# Patient Record
Sex: MALE | Race: OTHER | HISPANIC OR LATINO | URBAN - METROPOLITAN AREA
[De-identification: names, ages, dates, MRNs, and addresses within clinical notes are randomized per-mention and may not be internally consistent; named-entity substitution may affect disease eponyms.]

---

## 2019-03-20 ENCOUNTER — TRANSCRIBE ORDERS (OUTPATIENT)
Dept: ADMINISTRATIVE | Facility: HOSPITAL | Age: 39
End: 2019-03-20

## 2019-03-20 DIAGNOSIS — M77.12 LATERAL EPICONDYLITIS OF LEFT ELBOW: ICD-10-CM

## 2019-03-20 DIAGNOSIS — S50.02XA CONTUSION OF LEFT ELBOW, INITIAL ENCOUNTER: Primary | ICD-10-CM

## 2024-09-10 ENCOUNTER — APPOINTMENT (EMERGENCY)
Dept: CT IMAGING | Facility: HOSPITAL | Age: 44
End: 2024-09-10
Payer: COMMERCIAL

## 2024-09-10 ENCOUNTER — HOSPITAL ENCOUNTER (EMERGENCY)
Facility: HOSPITAL | Age: 44
Discharge: HOME | End: 2024-09-10
Attending: EMERGENCY MEDICINE | Admitting: EMERGENCY MEDICINE
Payer: COMMERCIAL

## 2024-09-10 VITALS
SYSTOLIC BLOOD PRESSURE: 137 MMHG | OXYGEN SATURATION: 99 % | WEIGHT: 259.92 LBS | HEART RATE: 72 BPM | RESPIRATION RATE: 16 BRPM | DIASTOLIC BLOOD PRESSURE: 90 MMHG | TEMPERATURE: 98.6 F

## 2024-09-10 DIAGNOSIS — R10.9 ABDOMINAL PAIN: Primary | ICD-10-CM

## 2024-09-10 DIAGNOSIS — R74.01 TRANSAMINITIS: ICD-10-CM

## 2024-09-10 DIAGNOSIS — N20.0 KIDNEY STONE: ICD-10-CM

## 2024-09-10 LAB
ALBUMIN SERPL BCG-MCNC: 4.1 G/DL (ref 3.5–5)
ALP SERPL-CCNC: 73 U/L (ref 34–104)
ALT SERPL W P-5'-P-CCNC: 78 U/L (ref 7–52)
ANION GAP SERPL CALCULATED.3IONS-SCNC: 4 MMOL/L (ref 4–13)
AST SERPL W P-5'-P-CCNC: 83 U/L (ref 13–39)
BASOPHILS # BLD AUTO: 0.05 THOUSANDS/ÂΜL (ref 0–0.1)
BASOPHILS NFR BLD AUTO: 1 % (ref 0–1)
BILIRUB SERPL-MCNC: 0.35 MG/DL (ref 0.2–1)
BILIRUB UR QL STRIP: NEGATIVE
BUN SERPL-MCNC: 15 MG/DL (ref 5–25)
CALCIUM SERPL-MCNC: 9.2 MG/DL (ref 8.4–10.2)
CHLORIDE SERPL-SCNC: 103 MMOL/L (ref 96–108)
CLARITY UR: CLEAR
CO2 SERPL-SCNC: 29 MMOL/L (ref 21–32)
COLOR UR: YELLOW
CREAT SERPL-MCNC: 1.01 MG/DL (ref 0.6–1.3)
EOSINOPHIL # BLD AUTO: 0.15 THOUSAND/ÂΜL (ref 0–0.61)
EOSINOPHIL NFR BLD AUTO: 2 % (ref 0–6)
ERYTHROCYTE [DISTWIDTH] IN BLOOD BY AUTOMATED COUNT: 13.5 % (ref 11.6–15.1)
GFR SERPL CREATININE-BSD FRML MDRD: 90 ML/MIN/1.73SQ M
GLUCOSE SERPL-MCNC: 89 MG/DL (ref 65–140)
GLUCOSE UR STRIP-MCNC: NEGATIVE MG/DL
HCT VFR BLD AUTO: 47.5 % (ref 36.5–49.3)
HGB BLD-MCNC: 15.5 G/DL (ref 12–17)
HGB UR QL STRIP.AUTO: NEGATIVE
IMM GRANULOCYTES # BLD AUTO: 0.01 THOUSAND/UL (ref 0–0.2)
IMM GRANULOCYTES NFR BLD AUTO: 0 % (ref 0–2)
KETONES UR STRIP-MCNC: NEGATIVE MG/DL
LEUKOCYTE ESTERASE UR QL STRIP: NEGATIVE
LIPASE SERPL-CCNC: 48 U/L (ref 11–82)
LYMPHOCYTES # BLD AUTO: 2.77 THOUSANDS/ÂΜL (ref 0.6–4.47)
LYMPHOCYTES NFR BLD AUTO: 32 % (ref 14–44)
MCH RBC QN AUTO: 28.2 PG (ref 26.8–34.3)
MCHC RBC AUTO-ENTMCNC: 32.6 G/DL (ref 31.4–37.4)
MCV RBC AUTO: 86 FL (ref 82–98)
MONOCYTES # BLD AUTO: 0.76 THOUSAND/ÂΜL (ref 0.17–1.22)
MONOCYTES NFR BLD AUTO: 9 % (ref 4–12)
NEUTROPHILS # BLD AUTO: 5.05 THOUSANDS/ÂΜL (ref 1.85–7.62)
NEUTS SEG NFR BLD AUTO: 56 % (ref 43–75)
NITRITE UR QL STRIP: NEGATIVE
NRBC BLD AUTO-RTO: 0 /100 WBCS
PH UR STRIP.AUTO: 6 [PH] (ref 4.5–8)
PLATELET # BLD AUTO: 199 THOUSANDS/UL (ref 149–390)
PMV BLD AUTO: 11.5 FL (ref 8.9–12.7)
POTASSIUM SERPL-SCNC: 4.6 MMOL/L (ref 3.5–5.3)
PROT SERPL-MCNC: 7.4 G/DL (ref 6.4–8.4)
PROT UR STRIP-MCNC: NEGATIVE MG/DL
RBC # BLD AUTO: 5.5 MILLION/UL (ref 3.88–5.62)
SODIUM SERPL-SCNC: 136 MMOL/L (ref 135–147)
SP GR UR STRIP.AUTO: 1.02 (ref 1–1.03)
UROBILINOGEN UR QL STRIP.AUTO: 0.2 E.U./DL
WBC # BLD AUTO: 8.79 THOUSAND/UL (ref 4.31–10.16)

## 2024-09-10 PROCEDURE — 99285 EMERGENCY DEPT VISIT HI MDM: CPT | Performed by: PHYSICIAN ASSISTANT

## 2024-09-10 PROCEDURE — 85025 COMPLETE CBC W/AUTO DIFF WBC: CPT | Performed by: EMERGENCY MEDICINE

## 2024-09-10 PROCEDURE — 96361 HYDRATE IV INFUSION ADD-ON: CPT

## 2024-09-10 PROCEDURE — 80053 COMPREHEN METABOLIC PANEL: CPT | Performed by: EMERGENCY MEDICINE

## 2024-09-10 PROCEDURE — 36415 COLL VENOUS BLD VENIPUNCTURE: CPT

## 2024-09-10 PROCEDURE — 99284 EMERGENCY DEPT VISIT MOD MDM: CPT

## 2024-09-10 PROCEDURE — 96374 THER/PROPH/DIAG INJ IV PUSH: CPT

## 2024-09-10 PROCEDURE — 81003 URINALYSIS AUTO W/O SCOPE: CPT

## 2024-09-10 PROCEDURE — 74177 CT ABD & PELVIS W/CONTRAST: CPT

## 2024-09-10 PROCEDURE — 83690 ASSAY OF LIPASE: CPT | Performed by: EMERGENCY MEDICINE

## 2024-09-10 RX ORDER — FAMOTIDINE 10 MG/ML
20 INJECTION, SOLUTION INTRAVENOUS ONCE
Status: COMPLETED | OUTPATIENT
Start: 2024-09-10 | End: 2024-09-10

## 2024-09-10 RX ADMIN — IOHEXOL 100 ML: 350 INJECTION, SOLUTION INTRAVENOUS at 16:08

## 2024-09-10 RX ADMIN — FAMOTIDINE 20 MG: 10 INJECTION, SOLUTION INTRAVENOUS at 15:46

## 2024-09-10 RX ADMIN — SODIUM CHLORIDE 1000 ML: 0.9 INJECTION, SOLUTION INTRAVENOUS at 15:47

## 2024-09-10 NOTE — ED PROVIDER NOTES
1. Abdominal pain    2. Transaminitis    3. Kidney stone      ED Disposition       ED Disposition   Discharge    Condition   Stable    Date/Time   Tue Sep 10, 2024  5:20 PM    Comment   Osvaldo Alex discharge to home/self care.                   Assessment & Plan       Medical Decision Making  DDx including but not limited to: appendicitis, gastroenteritis, gastritis, PUD, GERD, gastroparesis, hepatitis, pancreatitis, colitis, enteritis, food poisoning, epiploic appendagitis, mesenteric adenitis, mesenteric panniculitis, IBD, IBS, ileus, bowel obstruction, volvulus, cholecystitis, biliary colic, choledocholithiasis, perforated viscus, tumor, splenic etiology, constipation, diverticulitis, internal hernia, testicular torsion, renal colic, pyelonephritis, UTI.       Plan- Will check basic labs, UA, CT scan abd/pelvis. Will give IVF and pepcid     Labs- mildly elevated LFTs. Remainder of labs unremarkable.     CT abd/pelvis- No acute intra-abdominal pathology.  2 mm nonobstructing right intrarenal calculus.     Discussed all results with patient. Patient currently without pain. Discussed incidental findings of intrarenal calculus and transaminitis.    The management plan was discussed in detail with the patient at bedside and all questions were answered.  Prior to discharge, we provided both verbal and written instructions.  We discussed with the patient the signs and symptoms for which to return to the emergency department.  All questions were answered and patient was comfortable with the plan of care and discharged to home.  Instructed the patient to follow up with the primary care provider and/or specialist provided and their written instructions.  The patient verbalized understanding of our discussion and plan of care, and agrees to return to the Emergency Department for concerns and progression of illness.     At discharge, I instructed the patient to:  -follow up with pcp  -follow up with the recommended  specialists- GI  -return to the ER if symptoms worsened or new symptoms arose  Patient agreed to this plan and was stable at time of discharge.     Amount and/or Complexity of Data Reviewed  Labs: ordered. Decision-making details documented in ED Course.  Radiology: ordered. Decision-making details documented in ED Course.    Risk  Prescription drug management.              Chief Complaint   Patient presents with    Abdominal Pain     Pt c/o generalized abdominal pain starting today. Denies n/v and urinary symptoms.     History reviewed. No pertinent past medical history.   Prior to Admission medications    Not on File        History of Present Illness       HPI    Osvaldo Alex is a 43 y.o. male who identifies as a male presenting to the Emergency Department for abdominal pain. He states pain started this morning at  10san. Pain lasted about 30 mins resolved. Pain was generalized. Had something similar last week and pain resolved. There was no nausea, vomiting, diarrhea. Denies fever, chills, CP, SOB, dysuria, hematuria, frequency. No sick contacts. Denies alcohol or drug use.     Review of Systems   Constitutional:  Negative for chills and fever.   HENT:  Negative for congestion, ear pain and sore throat.    Eyes:  Negative for pain and visual disturbance.   Respiratory:  Negative for cough and shortness of breath.    Cardiovascular:  Negative for chest pain and palpitations.   Gastrointestinal:  Positive for abdominal pain. Negative for diarrhea, nausea and vomiting.   Genitourinary:  Negative for difficulty urinating, dysuria, flank pain, frequency and hematuria.   Musculoskeletal:  Negative for arthralgias and back pain.   Skin:  Negative for color change and rash.   Neurological:  Negative for syncope, weakness and numbness.   All other systems reviewed and are negative.          Objective     ED Triage Vitals [09/10/24 1424]   Temperature Pulse Blood Pressure Respirations SpO2 Patient Position -  Orthostatic VS   98.6 °F (37 °C) 72 131/84 18 96 % Sitting      Temp Source Heart Rate Source BP Location FiO2 (%) Pain Score    Oral Monitor Right arm -- --        Physical Exam  Vitals and nursing note reviewed.   Constitutional:       General: He is not in acute distress.     Appearance: Normal appearance. He is not ill-appearing, toxic-appearing or diaphoretic.   HENT:      Head: Normocephalic and atraumatic.      Right Ear: External ear normal.      Left Ear: External ear normal.      Nose: Nose normal.      Mouth/Throat:      Mouth: Mucous membranes are moist.   Eyes:      Conjunctiva/sclera: Conjunctivae normal.   Cardiovascular:      Rate and Rhythm: Normal rate and regular rhythm.      Heart sounds: Normal heart sounds. No murmur heard.  Pulmonary:      Effort: Pulmonary effort is normal. No respiratory distress.      Breath sounds: Normal breath sounds. No stridor. No wheezing or rhonchi.   Abdominal:      General: Abdomen is flat. Bowel sounds are normal. There is no distension.      Palpations: Abdomen is soft.      Tenderness: There is abdominal tenderness in the epigastric area. There is no guarding.       Musculoskeletal:         General: Normal range of motion.      Cervical back: Normal range of motion.   Neurological:      General: No focal deficit present.      Mental Status: He is alert.   Psychiatric:         Mood and Affect: Mood normal.         Labs Reviewed   COMPREHENSIVE METABOLIC PANEL - Abnormal; Notable for the following components:       Result Value    AST 83 (*)     ALT 78 (*)     All other components within normal limits    Narrative:     National Kidney Disease Foundation guidelines for Chronic Kidney Disease (CKD):     Stage 1 with normal or high GFR (GFR > 90 mL/min/1.73 square meters)    Stage 2 Mild CKD (GFR = 60-89 mL/min/1.73 square meters)    Stage 3A Moderate CKD (GFR = 45-59 mL/min/1.73 square meters)    Stage 3B Moderate CKD (GFR = 30-44 mL/min/1.73 square meters)    Stage  4 Severe CKD (GFR = 15-29 mL/min/1.73 square meters)    Stage 5 End Stage CKD (GFR <15 mL/min/1.73 square meters)  Note: GFR calculation is accurate only with a steady state creatinine   LIPASE - Normal   CBC AND DIFFERENTIAL   URINE MACROSCOPIC, POC    Narrative:     CLINITEK RESULT     CT abdomen pelvis with contrast   Final Interpretation by Charlotte Hummel MD (09/10 1643)      No acute intra-abdominal pathology.   2 mm nonobstructing right intrarenal calculus.         Workstation performed: [id number]             Procedures       Sarah Brooks PA-C  09/10/24 6627

## 2024-09-10 NOTE — Clinical Note
Osvaldo Alex was seen and treated in our emergency department on 9/10/2024.                Diagnosis:     Osvaldo  .    He may return on this date: 09/16/2024         If you have any questions or concerns, please don't hesitate to call.      Sarah Brooks PA-C    ______________________________           _______________          _______________  Hospital Representative                              Date                                Time

## 2024-10-09 ENCOUNTER — HOSPITAL ENCOUNTER (INPATIENT)
Facility: HOSPITAL | Age: 44
LOS: 2 days | Discharge: HOME | DRG: 948 | End: 2024-10-11
Attending: EMERGENCY MEDICINE
Payer: COMMERCIAL

## 2024-10-09 ENCOUNTER — APPOINTMENT (EMERGENCY)
Dept: CT IMAGING | Facility: HOSPITAL | Age: 44
DRG: 948 | End: 2024-10-09
Payer: COMMERCIAL

## 2024-10-09 ENCOUNTER — APPOINTMENT (EMERGENCY)
Dept: ULTRASOUND IMAGING | Facility: HOSPITAL | Age: 44
DRG: 948 | End: 2024-10-09
Payer: COMMERCIAL

## 2024-10-09 DIAGNOSIS — A04.8 H. PYLORI INFECTION: ICD-10-CM

## 2024-10-09 DIAGNOSIS — R74.01 TRANSAMINITIS: Primary | ICD-10-CM

## 2024-10-09 DIAGNOSIS — R10.9 ABDOMINAL PAIN: ICD-10-CM

## 2024-10-09 LAB
ALBUMIN SERPL BCG-MCNC: 4.3 G/DL (ref 3.5–5)
ALP SERPL-CCNC: 127 U/L (ref 34–104)
ALT SERPL W P-5'-P-CCNC: 1041 U/L (ref 7–52)
ANION GAP SERPL CALCULATED.3IONS-SCNC: 2 MMOL/L (ref 4–13)
ANISOCYTOSIS BLD QL SMEAR: PRESENT
APAP SERPL-MCNC: 5 UG/ML (ref 10–20)
AST SERPL W P-5'-P-CCNC: 878 U/L (ref 13–39)
BASOPHILS # BLD MANUAL: 0.06 THOUSAND/UL (ref 0–0.1)
BASOPHILS NFR MAR MANUAL: 1 % (ref 0–1)
BILIRUB SERPL-MCNC: 0.58 MG/DL (ref 0.2–1)
BILIRUB UR QL STRIP: NEGATIVE
BUN SERPL-MCNC: 12 MG/DL (ref 5–25)
CALCIUM SERPL-MCNC: 9.8 MG/DL (ref 8.4–10.2)
CHLORIDE SERPL-SCNC: 100 MMOL/L (ref 96–108)
CLARITY UR: CLEAR
CO2 SERPL-SCNC: 35 MMOL/L (ref 21–32)
COLOR UR: YELLOW
CREAT SERPL-MCNC: 1 MG/DL (ref 0.6–1.3)
EOSINOPHIL # BLD MANUAL: 0.26 THOUSAND/UL (ref 0–0.4)
EOSINOPHIL NFR BLD MANUAL: 4 % (ref 0–6)
ERYTHROCYTE [DISTWIDTH] IN BLOOD BY AUTOMATED COUNT: 13.8 % (ref 11.6–15.1)
ETHANOL SERPL-MCNC: <10 MG/DL
GFR SERPL CREATININE-BSD FRML MDRD: 91 ML/MIN/1.73SQ M
GIANT PLATELETS BLD QL SMEAR: PRESENT
GLUCOSE SERPL-MCNC: 108 MG/DL (ref 65–140)
GLUCOSE UR STRIP-MCNC: NEGATIVE MG/DL
HCT VFR BLD AUTO: 47.8 % (ref 36.5–49.3)
HGB BLD-MCNC: 16 G/DL (ref 12–17)
HGB UR QL STRIP.AUTO: NEGATIVE
KETONES UR STRIP-MCNC: NEGATIVE MG/DL
LEUKOCYTE ESTERASE UR QL STRIP: NEGATIVE
LIPASE SERPL-CCNC: 68 U/L (ref 11–82)
LYMPHOCYTES # BLD AUTO: 1.99 THOUSAND/UL (ref 0.6–4.47)
LYMPHOCYTES # BLD AUTO: 29 % (ref 14–44)
MCH RBC QN AUTO: 28.9 PG (ref 26.8–34.3)
MCHC RBC AUTO-ENTMCNC: 33.5 G/DL (ref 31.4–37.4)
MCV RBC AUTO: 86 FL (ref 82–98)
MONOCYTES # BLD AUTO: 0.26 THOUSAND/UL (ref 0–1.22)
MONOCYTES NFR BLD: 4 % (ref 4–12)
NEUTROPHILS # BLD MANUAL: 3.86 THOUSAND/UL (ref 1.85–7.62)
NEUTS BAND NFR BLD MANUAL: 1 % (ref 0–8)
NEUTS SEG NFR BLD AUTO: 59 % (ref 43–75)
NITRITE UR QL STRIP: NEGATIVE
PH UR STRIP.AUTO: 6.5 [PH] (ref 4.5–8)
PLATELET # BLD AUTO: 171 THOUSANDS/UL (ref 149–390)
PLATELET BLD QL SMEAR: ADEQUATE
PMV BLD AUTO: 11.6 FL (ref 8.9–12.7)
POTASSIUM SERPL-SCNC: 4.7 MMOL/L (ref 3.5–5.3)
PROT SERPL-MCNC: 7.8 G/DL (ref 6.4–8.4)
PROT UR STRIP-MCNC: NEGATIVE MG/DL
RBC # BLD AUTO: 5.53 MILLION/UL (ref 3.88–5.62)
SALICYLATES SERPL-MCNC: <5 MG/DL (ref 3–20)
SODIUM SERPL-SCNC: 137 MMOL/L (ref 135–147)
SP GR UR STRIP.AUTO: 1.02 (ref 1–1.03)
UROBILINOGEN UR QL STRIP.AUTO: 0.2 E.U./DL
VARIANT LYMPHS # BLD AUTO: 2 %
WBC # BLD AUTO: 6.43 THOUSAND/UL (ref 4.31–10.16)

## 2024-10-09 PROCEDURE — 80053 COMPREHEN METABOLIC PANEL: CPT

## 2024-10-09 PROCEDURE — 36415 COLL VENOUS BLD VENIPUNCTURE: CPT

## 2024-10-09 PROCEDURE — 99284 EMERGENCY DEPT VISIT MOD MDM: CPT

## 2024-10-09 PROCEDURE — 96361 HYDRATE IV INFUSION ADD-ON: CPT

## 2024-10-09 PROCEDURE — 85027 COMPLETE CBC AUTOMATED: CPT

## 2024-10-09 PROCEDURE — 96375 TX/PRO/DX INJ NEW DRUG ADDON: CPT

## 2024-10-09 PROCEDURE — 82077 ASSAY SPEC XCP UR&BREATH IA: CPT

## 2024-10-09 PROCEDURE — 80179 DRUG ASSAY SALICYLATE: CPT

## 2024-10-09 PROCEDURE — 85007 BL SMEAR W/DIFF WBC COUNT: CPT

## 2024-10-09 PROCEDURE — 80143 DRUG ASSAY ACETAMINOPHEN: CPT

## 2024-10-09 PROCEDURE — 76705 ECHO EXAM OF ABDOMEN: CPT

## 2024-10-09 PROCEDURE — 80074 ACUTE HEPATITIS PANEL: CPT | Performed by: EMERGENCY MEDICINE

## 2024-10-09 PROCEDURE — 96374 THER/PROPH/DIAG INJ IV PUSH: CPT

## 2024-10-09 PROCEDURE — 83690 ASSAY OF LIPASE: CPT

## 2024-10-09 PROCEDURE — 81003 URINALYSIS AUTO W/O SCOPE: CPT

## 2024-10-09 PROCEDURE — 74177 CT ABD & PELVIS W/CONTRAST: CPT

## 2024-10-09 RX ORDER — FAMOTIDINE 20 MG/1
20 TABLET, FILM COATED ORAL ONCE
Status: COMPLETED | OUTPATIENT
Start: 2024-10-09 | End: 2024-10-09

## 2024-10-09 RX ORDER — KETOROLAC TROMETHAMINE 30 MG/ML
15 INJECTION, SOLUTION INTRAMUSCULAR; INTRAVENOUS ONCE
Status: COMPLETED | OUTPATIENT
Start: 2024-10-09 | End: 2024-10-09

## 2024-10-09 RX ORDER — ONDANSETRON 2 MG/ML
4 INJECTION INTRAMUSCULAR; INTRAVENOUS ONCE
Status: COMPLETED | OUTPATIENT
Start: 2024-10-09 | End: 2024-10-09

## 2024-10-09 RX ORDER — ACETAMINOPHEN 325 MG/1
650 TABLET ORAL ONCE
Status: COMPLETED | OUTPATIENT
Start: 2024-10-09 | End: 2024-10-09

## 2024-10-09 RX ADMIN — ACETAMINOPHEN 650 MG: 325 TABLET, FILM COATED ORAL at 21:26

## 2024-10-09 RX ADMIN — ONDANSETRON 4 MG: 2 INJECTION INTRAMUSCULAR; INTRAVENOUS at 21:33

## 2024-10-09 RX ADMIN — FAMOTIDINE 20 MG: 20 TABLET, FILM COATED ORAL at 21:25

## 2024-10-09 RX ADMIN — IOHEXOL 100 ML: 350 INJECTION, SOLUTION INTRAVENOUS at 22:18

## 2024-10-09 RX ADMIN — SODIUM CHLORIDE 1000 ML: 0.9 INJECTION, SOLUTION INTRAVENOUS at 21:33

## 2024-10-09 RX ADMIN — KETOROLAC TROMETHAMINE 15 MG: 30 INJECTION, SOLUTION INTRAMUSCULAR; INTRAVENOUS at 21:33

## 2024-10-09 NOTE — Clinical Note
Case was discussed with SILVIA and the patient's admission status was agreed to be Admission Status: observation status to the service of Dr. Alcazar

## 2024-10-09 NOTE — LETTER
[place] [address]  [address] [facility] [address]  Dept: [phone]    October 11, 2024     Patient: Osvaldo Alex   YOB: 1980   Date of Visit: 10/9/2024       To Whom it May Concern:    Osvaldo Alex is under my professional care. He was seen in the hospital from 10/9/2024 to 10/11/24. He may return to work on 10/14/2024 without limitations.    If you have any questions or concerns, please don't hesitate to call.         Sincerely,          Papo Aden MD

## 2024-10-10 ENCOUNTER — APPOINTMENT (INPATIENT)
Dept: ULTRASOUND IMAGING | Facility: HOSPITAL | Age: 44
DRG: 948 | End: 2024-10-10
Payer: COMMERCIAL

## 2024-10-10 PROBLEM — L40.0 PLAQUE PSORIASIS: Status: ACTIVE | Noted: 2024-10-10

## 2024-10-10 PROBLEM — E78.5 HYPERLIPIDEMIA: Status: ACTIVE | Noted: 2024-10-10

## 2024-10-10 LAB
ALBUMIN SERPL BCG-MCNC: 4 G/DL (ref 3.5–5)
ALP SERPL-CCNC: 120 U/L (ref 34–104)
ALT SERPL W P-5'-P-CCNC: 888 U/L (ref 7–52)
ANA SER QL IA: NEGATIVE
ANION GAP SERPL CALCULATED.3IONS-SCNC: 6 MMOL/L (ref 4–13)
APAP SERPL-MCNC: <2 UG/ML (ref 10–20)
AST SERPL W P-5'-P-CCNC: 531 U/L (ref 13–39)
BASOPHILS # BLD AUTO: 0.04 THOUSANDS/ΜL (ref 0–0.1)
BASOPHILS NFR BLD AUTO: 1 % (ref 0–1)
BILIRUB SERPL-MCNC: 0.62 MG/DL (ref 0.2–1)
BUN SERPL-MCNC: 11 MG/DL (ref 5–25)
CALCIUM SERPL-MCNC: 8.9 MG/DL (ref 8.4–10.2)
CHLORIDE SERPL-SCNC: 103 MMOL/L (ref 96–108)
CK SERPL-CCNC: 187 U/L (ref 39–308)
CO2 SERPL-SCNC: 28 MMOL/L (ref 21–32)
CREAT SERPL-MCNC: 0.88 MG/DL (ref 0.6–1.3)
EOSINOPHIL # BLD AUTO: 0.14 THOUSAND/ΜL (ref 0–0.61)
EOSINOPHIL NFR BLD AUTO: 2 % (ref 0–6)
ERYTHROCYTE [DISTWIDTH] IN BLOOD BY AUTOMATED COUNT: 13.7 % (ref 11.6–15.1)
GFR SERPL CREATININE-BSD FRML MDRD: 105 ML/MIN/1.73SQ M
GLUCOSE SERPL-MCNC: 85 MG/DL (ref 65–140)
HAV IGM SER QL: NORMAL
HBV CORE IGM SER QL: NORMAL
HBV SURFACE AG SER QL: NORMAL
HCT VFR BLD AUTO: 47.3 % (ref 36.5–49.3)
HCV AB SER QL: NORMAL
HGB BLD-MCNC: 15.7 G/DL (ref 12–17)
IMM GRANULOCYTES # BLD AUTO: 0.01 THOUSAND/UL (ref 0–0.2)
IMM GRANULOCYTES NFR BLD AUTO: 0 % (ref 0–2)
INR PPP: 0.95 (ref 0.85–1.19)
LYMPHOCYTES # BLD AUTO: 1.83 THOUSANDS/ΜL (ref 0.6–4.47)
LYMPHOCYTES NFR BLD AUTO: 32 % (ref 14–44)
MAGNESIUM SERPL-MCNC: 2.1 MG/DL (ref 1.9–2.7)
MCH RBC QN AUTO: 28.8 PG (ref 26.8–34.3)
MCHC RBC AUTO-ENTMCNC: 33.2 G/DL (ref 31.4–37.4)
MCV RBC AUTO: 87 FL (ref 82–98)
MONOCYTES # BLD AUTO: 0.45 THOUSAND/ΜL (ref 0.17–1.22)
MONOCYTES NFR BLD AUTO: 8 % (ref 4–12)
NEUTROPHILS # BLD AUTO: 3.29 THOUSANDS/ΜL (ref 1.85–7.62)
NEUTS SEG NFR BLD AUTO: 57 % (ref 43–75)
NRBC BLD AUTO-RTO: 0 /100 WBCS
PLATELET # BLD AUTO: 166 THOUSANDS/UL (ref 149–390)
PMV BLD AUTO: 12.3 FL (ref 8.9–12.7)
POTASSIUM SERPL-SCNC: 4 MMOL/L (ref 3.5–5.3)
PROT SERPL-MCNC: 7.3 G/DL (ref 6.4–8.4)
PROTHROMBIN TIME: 12.9 SECONDS (ref 12.3–15)
RBC # BLD AUTO: 5.46 MILLION/UL (ref 3.88–5.62)
SODIUM SERPL-SCNC: 137 MMOL/L (ref 135–147)
WBC # BLD AUTO: 5.76 THOUSAND/UL (ref 4.31–10.16)

## 2024-10-10 PROCEDURE — 86664 EPSTEIN-BARR NUCLEAR ANTIGEN: CPT | Performed by: STUDENT IN AN ORGANIZED HEALTH CARE EDUCATION/TRAINING PROGRAM

## 2024-10-10 PROCEDURE — 87529 HSV DNA AMP PROBE: CPT | Performed by: STUDENT IN AN ORGANIZED HEALTH CARE EDUCATION/TRAINING PROGRAM

## 2024-10-10 PROCEDURE — 93976 VASCULAR STUDY: CPT

## 2024-10-10 PROCEDURE — 80143 DRUG ASSAY ACETAMINOPHEN: CPT

## 2024-10-10 PROCEDURE — 85025 COMPLETE CBC W/AUTO DIFF WBC: CPT

## 2024-10-10 PROCEDURE — 82787 IGG 1 2 3 OR 4 EACH: CPT | Performed by: STUDENT IN AN ORGANIZED HEALTH CARE EDUCATION/TRAINING PROGRAM

## 2024-10-10 PROCEDURE — 99222 1ST HOSP IP/OBS MODERATE 55: CPT

## 2024-10-10 PROCEDURE — 86663 EPSTEIN-BARR ANTIBODY: CPT | Performed by: STUDENT IN AN ORGANIZED HEALTH CARE EDUCATION/TRAINING PROGRAM

## 2024-10-10 PROCEDURE — 86644 CMV ANTIBODY: CPT | Performed by: STUDENT IN AN ORGANIZED HEALTH CARE EDUCATION/TRAINING PROGRAM

## 2024-10-10 PROCEDURE — 86038 ANTINUCLEAR ANTIBODIES: CPT | Performed by: STUDENT IN AN ORGANIZED HEALTH CARE EDUCATION/TRAINING PROGRAM

## 2024-10-10 PROCEDURE — 99254 IP/OBS CNSLTJ NEW/EST MOD 60: CPT | Performed by: INTERNAL MEDICINE

## 2024-10-10 PROCEDURE — 86665 EPSTEIN-BARR CAPSID VCA: CPT | Performed by: STUDENT IN AN ORGANIZED HEALTH CARE EDUCATION/TRAINING PROGRAM

## 2024-10-10 PROCEDURE — 82784 ASSAY IGA/IGD/IGG/IGM EACH: CPT | Performed by: STUDENT IN AN ORGANIZED HEALTH CARE EDUCATION/TRAINING PROGRAM

## 2024-10-10 PROCEDURE — 86645 CMV ANTIBODY IGM: CPT | Performed by: STUDENT IN AN ORGANIZED HEALTH CARE EDUCATION/TRAINING PROGRAM

## 2024-10-10 PROCEDURE — 86015 ACTIN ANTIBODY EACH: CPT | Performed by: STUDENT IN AN ORGANIZED HEALTH CARE EDUCATION/TRAINING PROGRAM

## 2024-10-10 PROCEDURE — 85610 PROTHROMBIN TIME: CPT

## 2024-10-10 PROCEDURE — 83735 ASSAY OF MAGNESIUM: CPT

## 2024-10-10 PROCEDURE — 82550 ASSAY OF CK (CPK): CPT | Performed by: STUDENT IN AN ORGANIZED HEALTH CARE EDUCATION/TRAINING PROGRAM

## 2024-10-10 PROCEDURE — 80053 COMPREHEN METABOLIC PANEL: CPT

## 2024-10-10 RX ORDER — PRAVASTATIN SODIUM 40 MG
40 TABLET ORAL
Status: DISCONTINUED | OUTPATIENT
Start: 2024-10-10 | End: 2024-10-10

## 2024-10-10 RX ORDER — SODIUM CHLORIDE 9 MG/ML
50 INJECTION, SOLUTION INTRAVENOUS CONTINUOUS
Status: DISCONTINUED | OUTPATIENT
Start: 2024-10-10 | End: 2024-10-10

## 2024-10-10 RX ADMIN — SODIUM CHLORIDE 50 ML/HR: 0.9 INJECTION, SOLUTION INTRAVENOUS at 02:57

## 2024-10-10 NOTE — ASSESSMENT & PLAN NOTE
[facility]  Receives ixekizumab (Taltz) injections monthly

## 2024-10-10 NOTE — ED ATTENDING ATTESTATION
10/9/2024  I, Elbert Faith MD, saw and evaluated the patient. I have discussed the patient with the resident/non-physician practitioner and agree with the resident's/non-physician practitioner's findings, Plan of Care, and MDM as documented in the resident's/non-physician practitioner's note, except where noted. All available labs and Radiology studies were reviewed.  I was present for key portions of any procedure(s) performed by the resident/non-physician practitioner and I was immediately available to provide assistance.       At this point I agree with the current assessment done in the Emergency Department.  I have conducted an independent evaluation of this patient a history and physical is as follows:    ED Course     43-year-old male here for evaluation of upper abdominal pain.  He was seen in our emergency department about a month ago for similar symptoms.  At that time had labs and CT scan abdomen pelvis that was unremarkable/reassuring.  States that symptoms returned today.  He is describing generalized abdominal pain in his epigastrium and right upper quadrant with radiation to his back.  Not really having nausea or vomiting.  No fevers.  History of bariatric surgery about 2 years ago.    Exam: GCS 15 nonfocal, sclera nonicteric conjunctive normal, regular rate and rhythm, clear to auscultation bilaterally, abdomen is soft, obese, nondistended, mild epigastric tenderness without rebound or guarding.  Extremities nontender without edema.    Impression and plan: Epigastric and right upper quadrant pain with radiation to the back.  Will plan to check laboratory studies to rule out biliary obstructive disease, pancreatitis, significant leukocytosis or electrolyte abnormalities.  Treat symptoms, CT scan abdomen pelvis with contrast and reassess.    Critical Care Time  Procedures

## 2024-10-10 NOTE — ED PROVIDER NOTES
Final diagnoses:   Abdominal pain   Transaminitis     ED Disposition       ED Disposition   Admit    Condition   Stable    Date/Time   Wed Oct 9, 2024 11:24 PM    Comment   Case was discussed with SILVIA and the patient's admission status was agreed to be Admission Status: observation status to the service of Dr. Muniz.               Assessment & Plan       Medical Decision Making  43-year-old male presents emergency department complaining of abdominal pain    DDx: Pancreatitis, gastritis, perforated viscus, gastric ulcer, cholecystitis, cholelithiasis    Plan: CT abdomen and pelvis, CMP, CBC, lipase, UA, multimodal regimen for pain control, IV fluids    See ED course for further discussion      Amount and/or Complexity of Data Reviewed  Labs: ordered. Decision-making details documented in ED Course.  Radiology: ordered. Decision-making details documented in ED Course.    Risk  OTC drugs.  Prescription drug management.  Decision regarding hospitalization.        ED Course as of 10/10/24 0029   Wed Oct 09, 2024   2106 Blood Pressure: 149/85   2106 Temperature: 98.2 °F (36.8 °C)   2106 Temp Source: Oral   2106 Pulse: 63   2106 Respirations: 18   2106 SpO2: 97 %   2144 Color, UA: Yellow   2144 Clarity, UA: Clear   2144 pH, UA: 6.5   2144 Leukocytes, UA: Negative   2144 Nitrite, UA: Negative   2144 Ketones, UA: Negative   2144 Glucose, UA: Negative   2144 WBC: 6.43   2144 RBC: 5.53   2144 Hemoglobin: 16.0   2144 Hematocrit: 47.8   2144 Platelet Count: 171   2213 AST(!): 878   2213 ALT(!): 1,041   2213 ALK PHOS(!): 127   2230 Sodium: 137   2231 Potassium: 4.7   2231 GFR, Calculated: 91   2244 CT Abdomen pelvis with contrast  IMPRESSION:     No acute abdominopelvic abnormality identified.  No free air or free fluid.     Stable punctate right nephrolithiasis.        Workstation performed: [id number]     2310 ACETAMINOPHEN LEVEL(!): 5   2310 SALICYLATE LEVEL: <5   2310 ETHANOL: <10   2324 Patient reevaluated.  Explained  all test results.  Denies Tylenol ingestion.  Denies any recent travel.  No one else around him is sick.  Will order right upper quadrant and admit to [facility] for further observation       Medications   ketorolac (TORADOL) injection 15 mg (15 mg Intravenous Given 10/9/24 2133)   ondansetron (ZOFRAN) injection 4 mg (4 mg Intravenous Given 10/9/24 2133)   sodium chloride 0.9 % bolus 1,000 mL (0 mL Intravenous Stopped 10/9/24 2322)   acetaminophen (TYLENOL) tablet 650 mg (650 mg Oral Given 10/9/24 2126)   famotidine (PEPCID) tablet 20 mg (20 mg Oral Given 10/9/24 2125)   iohexol (OMNIPAQUE) 350 MG/ML injection (MULTI-DOSE) 100 mL (100 mL Intravenous Given 10/9/24 2218)       ED Risk Strat Scores                           SBIRT 22yo+      Flowsheet Row Most Recent Value   Initial Alcohol Screen:  AUDIT-C     1. How often do you have a drink containing alcohol? 0 Filed at: 10/09/2024 2059   2. How many drinks containing alcohol do you have on a typical day you are drinking?  0 Filed at: 10/09/2024 2059   3a. Male UNDER 65: How often do you have five or more drinks on one occasion? 0 Filed at: 10/09/2024 2059   Audit-C Score 0 Filed at: 10/09/2024 2059   AMAURI: How many times in the past year have you...    Used an illegal drug or used a prescription medication for non-medical reasons? Never Filed at: 10/09/2024 2059                            History of Present Illness       Chief Complaint   Patient presents with    Abdominal Pain     Pt states having abd pain since Monday. Currently 10/10 pain. Took omeprazole w/ no relief. Denies n/v/d. Denies  symptoms       No past medical history on file.   No past surgical history on file.   No family history on file.   Social History     Tobacco Use    Smoking status: Never    Smokeless tobacco: Never   Substance Use Topics    Alcohol use: Yes     Comment: occ    Drug use: Never      E-Cigarette/Vaping      E-Cigarette/Vaping Substances      I have reviewed and agree with the  history as documented.     43-year-old male with a past medical history of abdominal pain, transaminitis, nephrolithiasis presents emergency department with upper abdominal pain.  Complains of pain in the right upper, epigastric and left upper quadrant regions.  Pain started suddenly about 4 hours ago.  No dysuria or hematuria.  No vomiting however intermittent nausea.  States his pain feels similar to his previous episode.        Review of Systems   Gastrointestinal:  Positive for abdominal pain and nausea. Negative for vomiting.   All other systems reviewed and are negative.          Objective       ED Triage Vitals   Temperature Pulse Blood Pressure Respirations SpO2 Patient Position - Orthostatic VS   10/09/24 2059 10/09/24 2059 10/09/24 2059 10/09/24 2059 10/09/24 2059 10/09/24 2059   98.2 °F (36.8 °C) 63 149/85 18 97 % Sitting      Temp Source Heart Rate Source BP Location FiO2 (%) Pain Score    10/09/24 2059 10/09/24 2059 10/09/24 2059 -- 10/09/24 2126    Oral Monitor Right arm  10 - Worst Possible Pain      Vitals      Date and Time Temp Pulse SpO2 Resp BP Pain Score FACES Pain Rating User   10/09/24 2321 -- 60 96 % 18 121/86 -- -- AA   10/09/24 2158 -- -- -- -- -- 6 -- AA   10/09/24 2126 -- -- -- -- -- 10 - Worst Possible Pain -- AA   10/09/24 2059 98.2 °F (36.8 °C) 63 97 % 18 149/85 -- -- DR            Physical Exam  Vitals and nursing note reviewed.   Constitutional:       General: He is not in acute distress.     Appearance: He is well-developed.   HENT:      Head: Normocephalic and atraumatic.   Eyes:      Conjunctiva/sclera: Conjunctivae normal.   Cardiovascular:      Rate and Rhythm: Normal rate and regular rhythm.      Heart sounds: No murmur heard.  Pulmonary:      Effort: Pulmonary effort is normal. No respiratory distress.      Breath sounds: Normal breath sounds.   Abdominal:      Palpations: Abdomen is soft.      Tenderness: There is abdominal tenderness in the right upper quadrant, epigastric  area and left upper quadrant.   Musculoskeletal:         General: No swelling.      Cervical back: Neck supple.   Skin:     General: Skin is warm and dry.      Capillary Refill: Capillary refill takes less than 2 seconds.   Neurological:      General: No focal deficit present.      Mental Status: He is alert and oriented to person, place, and time. Mental status is at baseline.      GCS: GCS eye subscore is 4. GCS verbal subscore is 5. GCS motor subscore is 6.      Cranial Nerves: No cranial nerve deficit.      Sensory: No sensory deficit.      Motor: No weakness.      Coordination: Coordination normal.      Gait: Gait normal.   Psychiatric:         Mood and Affect: Mood normal.         Results Reviewed       Procedure Component Value Units Date/Time    Salicylate level [[id number]]  (Normal) Collected: 10/09/24 2231    Lab Status: Final result Specimen: Blood from Arm, Right Updated: 10/09/24 2308     Salicylate Lvl <5 mg/dL     Acetaminophen level-If concentration is detectable, please discuss with medical  on call. [[id number]]  (Abnormal) Collected: 10/09/24 2231    Lab Status: Final result Specimen: Blood from Arm, Right Updated: 10/09/24 2308     Acetaminophen Level 5 ug/mL     Ethanol [[id number]]  (Normal) Collected: 10/09/24 2231    Lab Status: Final result Specimen: Blood from Arm, Right Updated: 10/09/24 2251     Ethanol Lvl <10 mg/dL     Hepatitis panel, acute [[id number]] Collected: 10/09/24 2231    Lab Status: In process Specimen: Blood from Arm, Right Updated: 10/09/24 2236    CMP [[id number]]  (Abnormal) Collected: 10/09/24 2136    Lab Status: Final result Specimen: Blood from Arm, Right Updated: 10/09/24 2208     Sodium 137 mmol/L      Potassium 4.7 mmol/L      Chloride 100 mmol/L      CO2 35 mmol/L      ANION GAP 2 mmol/L      BUN 12 mg/dL      Creatinine 1.00 mg/dL      Glucose 108 mg/dL      Calcium 9.8 mg/dL       U/L      ALT 1,041 U/L      Alkaline Phosphatase 127 U/L      Total  Protein 7.8 g/dL      Albumin 4.3 g/dL      Total Bilirubin 0.58 mg/dL      eGFR 91 ml/min/1.73sq m     Narrative:      National Kidney Disease Foundation guidelines for Chronic Kidney Disease (CKD):     Stage 1 with normal or high GFR (GFR > 90 mL/min/1.73 square meters)    Stage 2 Mild CKD (GFR = 60-89 mL/min/1.73 square meters)    Stage 3A Moderate CKD (GFR = 45-59 mL/min/1.73 square meters)    Stage 3B Moderate CKD (GFR = 30-44 mL/min/1.73 square meters)    Stage 4 Severe CKD (GFR = 15-29 mL/min/1.73 square meters)    Stage 5 End Stage CKD (GFR <15 mL/min/1.73 square meters)  Note: GFR calculation is accurate only with a steady state creatinine    Lipase [[id number]]  (Normal) Collected: 10/09/24 2136    Lab Status: Final result Specimen: Blood from Arm, Right Updated: 10/09/24 2208     Lipase 68 u/L     Manual Differential(PHLEBS Do Not Order) [[id number]]  (Abnormal) Collected: 10/09/24 2136    Lab Status: Final result Specimen: Blood from Arm, Right Updated: 10/09/24 2206     Segmented % 59 %      Bands % 1 %      Lymphocytes % 29 %      Monocytes % 4 %      Eosinophils % 4 %      Basophils % 1 %      Atypical Lymphocytes % 2 %      Absolute Neutrophils 3.86 Thousand/uL      Absolute Lymphocytes 1.99 Thousand/uL      Absolute Monocytes 0.26 Thousand/uL      Absolute Eosinophils 0.26 Thousand/uL      Absolute Basophils 0.06 Thousand/uL      Total Counted --     Platelet Estimate Adequate     Giant PLTs Present     Anisocytosis Present    CBC and differential [[id number]]  (Normal) Collected: 10/09/24 2136    Lab Status: Final result Specimen: Blood from Arm, Right Updated: 10/09/24 2144     WBC 6.43 Thousand/uL      RBC 5.53 Million/uL      Hemoglobin 16.0 g/dL      Hematocrit 47.8 %      MCV 86 fL      MCH 28.9 pg      MCHC 33.5 g/dL      RDW 13.8 %      MPV 11.6 fL      Platelets 171 Thousands/uL     Narrative:      This is an appended report.  These results have been appended to a previously verified report.     Urine Macroscopic, POC [[id number]] Collected: 10/09/24 2141    Lab Status: Final result Specimen: Urine Updated: 10/09/24 2142     Color, UA Yellow     Clarity, UA Clear     pH, UA 6.5     Leukocytes, UA Negative     Nitrite, UA Negative     Protein, UA Negative mg/dl      Glucose, UA Negative mg/dl      Ketones, UA Negative mg/dl      Urobilinogen, UA 0.2 E.U./dl      Bilirubin, UA Negative     Occult Blood, UA Negative     Specific Gravity, UA 1.020    Narrative:      CLINITEK RESULT            US right upper quadrant   Final Interpretation by Pamela Osei MD (10/09 2342)   Cholelithiasis. No sonographic signs of cholecystitis.   No biliary dilatation. Unremarkable liver.   Incidental findings, as above.      Workstation performed: [id number]         CT Abdomen pelvis with contrast   Final Interpretation by Jacques Madden MD (10/09 2238)      No acute abdominopelvic abnormality identified.   No free air or free fluid.      Stable punctate right nephrolithiasis.         Workstation performed: [id number]             Procedures    ED Medication and Procedure Management   None     There are no discharge medications for this patient.    No discharge procedures on file.  ED SEPSIS DOCUMENTATION   Time reflects when diagnosis was documented in both MDM as applicable and the Disposition within this note       Time User Action Codes Description Comment    10/9/2024 10:33 PM Juan Zapata [R10.9] Abdominal pain     10/9/2024 10:34 PM Juan Zapata [R74.01] Transaminitis     10/9/2024 11:24 PM Juan Zapata [R10.9] Abdominal pain     10/9/2024 11:24 PM Juan Zapata [R74.01] Transaminitis                  Juan Zapata DO  10/10/24 0029

## 2024-10-10 NOTE — PLAN OF CARE
Problem: PAIN - ADULT  Goal: Verbalizes/displays adequate comfort level or baseline comfort level  Description: Interventions:  - Encourage patient to monitor pain and request assistance  - Assess pain using appropriate pain scale  - Administer analgesics based on type and severity of pain and evaluate response  - Implement non-pharmacological measures as appropriate and evaluate response  - Consider cultural and social influences on pain and pain management  - Notify physician/advanced practitioner if interventions unsuccessful or patient reports new pain  10/10/2024 1116 by Nisreen Aguirre RN  Outcome: Progressing  10/10/2024 1116 by Nisreen Aguirre RN  Outcome: Progressing  10/10/2024 1115 by Nisreen Aguirre RN  Outcome: Progressing     Problem: INFECTION - ADULT  Goal: Absence or prevention of progression during hospitalization  Description: INTERVENTIONS:  - Assess and monitor for signs and symptoms of infection  - Monitor lab/diagnostic results  - Monitor all insertion sites, i.e. indwelling lines, tubes, and drains  - Monitor endotracheal if appropriate and nasal secretions for changes in amount and color  - [place] appropriate cooling/warming therapies per order  - Administer medications as ordered  - Instruct and encourage patient and family to use good hand hygiene technique  - Identify and instruct in appropriate isolation precautions for identified infection/condition  10/10/2024 1116 by Nisreen Aguirre RN  Outcome: Progressing  10/10/2024 1116 by Nisreen Aguirre RN  Outcome: Progressing  10/10/2024 1115 by Nisreen Aguirre RN  Outcome: Progressing  Goal: Absence of fever/infection during neutropenic period  Description: INTERVENTIONS:  - Monitor WBC    10/10/2024 1116 by Nisreen Aguirre RN  Outcome: Progressing  10/10/2024 1116 by Nisreen Aguirre RN  Outcome: Progressing  10/10/2024 1115 by Nisreen Aguirre RN  Outcome: Progressing     Problem: SAFETY ADULT  Goal: Patient will remain free of falls  Description:  INTERVENTIONS:  - Educate patient/family on patient safety including physical limitations  - Instruct patient to call for assistance with activity   - Consult OT/PT to assist with strengthening/mobility   - Keep Call bell within reach  - Keep bed low and locked with side rails adjusted as appropriate  - Keep care items and personal belongings within reach  - Initiate and maintain comfort rounds  - Make Fall Risk Sign visible to staff  - Offer Toileting every 2 Hours, in advance of need  - Initiate/Maintain bedalarm  - Obtain necessary fall risk management equipment  - Apply yellow socks and bracelet for high fall risk patients  - Consider moving patient to room near nurses station  10/10/2024 1116 by Nisreen Aguirre RN  Outcome: Progressing  10/10/2024 1116 by Nisreen Aguirre RN  Outcome: Progressing  10/10/2024 1115 by Nisreen gAuirre RN  Outcome: Progressing  Goal: Maintain or return to baseline ADL function  Description: INTERVENTIONS:  -  Assess patient's ability to carry out ADLs; assess patient's baseline for ADL function and identify physical deficits which impact ability to perform ADLs (bathing, care of mouth/teeth, toileting, grooming, dressing, etc.)  - Assess/evaluate cause of self-care deficits   - Assess range of motion  - Assess patient's mobility; develop plan if impaired  - Assess patient's need for assistive devices and provide as appropriate  - Encourage maximum independence but intervene and supervise when necessary  - Involve family in performance of ADLs  - Assess for home care needs following discharge   - Consider OT consult to assist with ADL evaluation and planning for discharge  - Provide patient education as appropriate  10/10/2024 1116 by Nisreen Aguirre RN  Outcome: Progressing  10/10/2024 1116 by Nisreen Aguirre RN  Outcome: Progressing  10/10/2024 1115 by Nisreen Aguirre RN  Outcome: Progressing  Goal: Maintains/Returns to pre admission functional level  Description: INTERVENTIONS:  - Perform  AM-PAC 6 Click Basic Mobility/ Daily Activity assessment daily.  - Set and communicate daily mobility goal to care team and patient/family/caregiver.   - Collaborate with rehabilitation services on mobility goals if consulted  - Perform Range of Motion 3 times a day.  - Reposition patient every 1 hours.  - Dangle patient 2 times a day  - Stand patient 2 times a day  - Ambulate patient 3 times a day  - Out of bed to chair 3 times a day   - Out of bed for meals 3 times a day  - Out of bed for toileting  - Record patient progress and toleration of activity level   10/10/2024 1116 by Nisreen Aguirre RN  Outcome: Progressing  10/10/2024 1116 by Nisreen Aguirre RN  Outcome: Progressing  10/10/2024 1115 by Nisreen Aguirre RN  Outcome: Progressing     Problem: DISCHARGE PLANNING  Goal: Discharge to home or other facility with appropriate resources  Description: INTERVENTIONS:  - Identify barriers to discharge w/patient and caregiver  - Arrange for needed discharge resources and transportation as appropriate  - Identify discharge learning needs (meds, wound care, etc.)  - Arrange for interpretive services to assist at discharge as needed  - Refer to Case Management Department for coordinating discharge planning if the patient needs post-hospital services based on physician/advanced practitioner order or complex needs related to functional status, cognitive ability, or social support system  10/10/2024 1116 by Nisreen Aguirre RN  Outcome: Progressing  10/10/2024 1116 by Nisreen Aguirre RN  Outcome: Progressing  10/10/2024 1115 by Nisreen Aguirre RN  Outcome: Progressing     Problem: Knowledge Deficit  Goal: Patient/family/caregiver demonstrates understanding of disease process, treatment plan, medications, and discharge instructions  Description: Complete learning assessment and assess knowledge base.  Interventions:  - Provide teaching at level of understanding  - Provide teaching via preferred learning methods  10/10/2024 1116 by Nisreen  RHONDA Aguirre RN  Outcome: Progressing  10/10/2024 1116 by Nisreen Aguirre RN  Outcome: Progressing  10/10/2024 1115 by Nisreen Aguirre RN  Outcome: Progressing

## 2024-10-10 NOTE — H&P
"H&P - Hospitalist   Name: Osvaldo Alex 43 y.o. male I MRN: [id number]  Unit/Bed#: E5 -01 I Date of Admission: 10/9/2024   Date of Service: 10/10/2024 I Hospital Day: 1     Assessment & Plan  Transaminitis  Pt with PMHx of plaque psoarisis and HLD presented to the ED secondary to RUQ/epigastric pain and was incidentally found to have elevated liver enzymes  He reports his abdominal pain is now improved. He denies any other symptoms such as fever, chills, nausea, vomiting, or diarrhea  Pt denies sick contacts or recent illness  Of note, pt did present to ED with similar presentation 9/10 with mild transaminitis at that time  Pt reports tylenol use this evening for his abdominal pain, but states only taking 2-3 spaced out tablets prior to arrival to manage his symptoms. He denies frequent tylenol usage, but unable to provide dosing/timing.   Tylenol level 5 in the ED, will repeat in AM to ensure trending downward  Pt reports drinking on the weekends about 4 beers a day, but denies use during the week  CT a/p demonstrating \"No acute abdominopelvic abnormality identified. No free air or free fluid.\"  RUQ US demonstrating \"Cholelithiasis. No sonographic signs of cholecystitis.No biliary dilatation. Unremarkable liver.\"  Hepatitis panel pending   INR pending   Continue to trend CMP closely   GI consult pending   Hyperlipidemia  Continue statin therapy  Plaque psoriasis  [facility]  Receives ixekizumab (Taltz) injections monthly      VTE Pharmacologic Prophylaxis: VTE Score: 2 Low Risk (Score 0-2) - Encourage Ambulation.  Code Status: Level 1 - Full Code   Discussion with family: Patient declined call to .     Anticipated Length of Stay: Patient will be admitted on an inpatient basis with an anticipated length of stay of greater than 2 midnights secondary to transaminitis.    History of Present Illness   Chief Complaint: \"I was having abdominal pain earlier, but now its " "gone\"    Osvaldo Alex is a 43 y.o. male who presents with transaminitis. Pt reports abdominal pain beginning earlier this evening in the right upper quadrant/epigastric regions.  He denies any associated symptoms such as fever, chills, nausea, vomiting, or diarrhea.  Reports that his abdominal pain is now resolved.  He reports that he had a similar episode when he was seen in the hospital last month which also resolved spontaneously.  He reports that at that time he was also noted to have a mild transaminitis and [facility]  This evening, patient again has transaminitis, this time more significant.  He does report Tylenol use prior to coming to the ED in order to help his abdominal pain.  But states he only took 2 to 3 tablets spaced out to manage his symptoms although he is not able to provide dosing or timing.  He denies any frequent or consistent use of Tylenol.  Patient reports that he does drink about 4 beers each day over the weekend, however denies daily use.  He denies any recent illness or sick contacts.  He denies any recent travel.    Review of Systems   Constitutional:  Negative for appetite change, chills, diaphoresis, fatigue and fever.   HENT:  Negative for congestion, rhinorrhea and sore throat.    Eyes:  Negative for photophobia and visual disturbance.   Respiratory:  Negative for cough, shortness of breath and wheezing.    Cardiovascular:  Negative for chest pain, palpitations and leg swelling.   Gastrointestinal:  Positive for abdominal pain (RUQ/epigastric). Negative for abdominal distention, blood in stool, constipation, diarrhea, nausea and vomiting.   Genitourinary:  Negative for dysuria and hematuria.   Musculoskeletal:  Negative for arthralgias, back pain, myalgias and neck stiffness.   Skin:  Negative for color change and rash.   Neurological:  Negative for dizziness, seizures, syncope, weakness, light-headedness and headaches.   Psychiatric/Behavioral:  Negative " for agitation, behavioral problems and confusion. The patient is not nervous/anxious.    All other systems reviewed and are negative.      Historical Information   History reviewed. No pertinent past medical history.  History reviewed. No pertinent surgical history.  Social History     Tobacco Use    Smoking status: Never    Smokeless tobacco: Never   Substance and Sexual Activity    Alcohol use: Yes     Comment: occ    Drug use: Never    Sexual activity: Not on file     E-Cigarette/Vaping     E-Cigarette/Vaping Substances     History reviewed. No pertinent family history.  Social History:  Marital Status: Single   Patient Pre-hospital Living Situation: Home  Patient Pre-hospital Level of Mobility: walks  Patient Pre-hospital Diet Restrictions: none    Meds/Allergies   No medications prior to admission.     No Known Allergies    Objective :  Temp:  [97.4 °F (36.3 °C)-98.2 °F (36.8 °C)] 97.4 °F (36.3 °C)  HR:  [60-74] 74  BP: (121-149)/(85-96) 128/96  Resp:  [18] 18  SpO2:  [96 %-98 %] 98 %  O2 Device: None (Room air)    Physical Exam  Vitals and nursing note reviewed.   Constitutional:       General: He is not in acute distress.     Appearance: He is well-developed. He is not ill-appearing.   HENT:      Head: Normocephalic and atraumatic.      Nose: Nose normal. No congestion.      Mouth/Throat:      Mouth: Mucous membranes are moist.      Pharynx: Oropharynx is clear.   Eyes:      Conjunctiva/sclera: Conjunctivae normal.   Cardiovascular:      Rate and Rhythm: Normal rate and regular rhythm.      Heart sounds: Normal heart sounds. No murmur heard.     No friction rub. No gallop.   Pulmonary:      Effort: Pulmonary effort is normal. No respiratory distress.      Breath sounds: Normal breath sounds. No wheezing, rhonchi or rales.   Abdominal:      General: Bowel sounds are normal. There is no distension.      Palpations: Abdomen is soft.      Tenderness: There is no abdominal tenderness.   Musculoskeletal:       Cervical back: Neck supple.      Right lower leg: No edema.      Left lower leg: No edema.   Skin:     General: Skin is warm and dry.      Capillary Refill: Capillary refill takes less than 2 seconds.   Neurological:      General: No focal deficit present.      Mental Status: He is alert and oriented to person, place, and time. Mental status is at baseline.   Psychiatric:         Mood and Affect: Mood normal.         Behavior: Behavior normal.         Lines/Drains:            Lab Results: I have reviewed the following results:  Results from last 7 days   Lab Units 10/09/24  2136   WBC Thousand/uL 6.43   HEMOGLOBIN g/dL 16.0   HEMATOCRIT % 47.8   PLATELETS Thousands/uL 171   BANDS PCT % 1   LYMPHO PCT % 29   MONO PCT % 4   EOS PCT % 4     Results from last 7 days   Lab Units 10/09/24  2136   SODIUM mmol/L 137   POTASSIUM mmol/L 4.7   CHLORIDE mmol/L 100   CO2 mmol/L 35*   BUN mg/dL 12   CREATININE mg/dL 1.00   ANION GAP mmol/L 2*   CALCIUM mg/dL 9.8   ALBUMIN g/dL 4.3   TOTAL BILIRUBIN mg/dL 0.58   ALK PHOS U/L 127*   ALT U/L 1,041*   AST U/L 878*   GLUCOSE RANDOM mg/dL 108             Lab Results   Component Value Date    HGBA1C 5.5 01/29/2024    HGBA1C 5.5 10/24/2022    HGBA1C 5.9 (H) 06/04/2022           Imaging Results Review: I reviewed radiology reports from this admission including: CT abdomen/pelvis and Ultrasound(s).  Other Study Results Review: No additional pertinent studies reviewed.    Administrative Statements   I have spent a total time of 65 minutes in caring for this patient on the day of the visit/encounter including Diagnostic results, Patient and family education, Impressions, Counseling / Coordination of care, Documenting in the medical record, Reviewing / ordering tests, medicine, procedures  , Obtaining or reviewing history  , and Communicating with other healthcare professionals .    ** Please Note: This note has been constructed using a voice recognition system. **

## 2024-10-10 NOTE — UTILIZATION REVIEW
"Initial Clinical Review    Admission: Date/Time/Statement:   Admission Orders (From admission, onward)       Ordered        10/09/24 2324  INPATIENT ADMISSION  Once                          Orders Placed This Encounter   Procedures    INPATIENT ADMISSION     Standing Status:   Standing     Number of Occurrences:   1     Order Specific Question:   Level of Care     Answer:   Med Surg [16]     Order Specific Question:   Estimated length of stay     Answer:   More than 2 Midnights     Order Specific Question:   Certification     Answer:   I certify that inpatient services are medically necessary for this patient for a duration of greater than two midnights. See H&P and MD Progress Notes for additional information about the patient's course of treatment.     ED Arrival Information       Expected   -    Arrival   10/9/2024 20:52    Acuity   Urgent              Means of arrival   Walk-In    Escorted by   Self    Service   Hospitalist    Admission type   Emergency              Arrival complaint   Abdominal Pain             Chief Complaint   Patient presents with    Abdominal Pain     Pt states having abd pain since Monday. Currently 10/10 pain. Took omeprazole w/ no relief. Denies n/v/d. Denies  symptoms       Initial Presentation: 43 y.o. male   to ER from home w/c/o  abd pain since  10/7:  this evening more acute in RUQ.   He reports that he had a similar episode (w/transaminitis)   when he was seen in the hospital last month which also resolved spontaneously.      This evening, patient again has transaminitis, this time more significant.  He does report Tylenol use PTA .   PMH:   2022 he underwent sleeve gastrectomy. plaque psoriasis - on  ixekizumab (Taltz) injections monthly,  H. pylori infection,   Alcohol use - Reported last drink on 10/05.  (ethanol neg in ER)    IN ER:   ctap  \"No acute abdominopelvic abnormality identified.   RUQ US  unremarkable.    EXAM:  abd soft, nondistended,  non tender to " palpation    10/09   Admit  INPT  Status,  MS  Level of care  for workup/ management of  RUQ Abd pain w/transaminitis  Obtain hepatitis panel and INR.  Trend cmp daily.  Consult GI.  Cont statin therapy.  Send stool for pylori antigen.  Daily wgt.  I/O q shift.  Amb TID.  Red diet as tolerated.  IVF NS  @  50 cc/hr.     Anticipated Length of Stay/Certification Statement:  Patient will be admitted on an inpatient basis with an anticipated length of stay of greater than 2 midnights secondary to transaminitis.     Date: 10/10      Day 2:   pt states symptoms resolved,  tolerating  diet.     10/10  GI CONSULT:   need to  r/o viral hepatitis and autoimmune etiologies.  Check Doppler ultrasound to rule out PVT.  check stool antigen  (for H pylori)  does not require endoscopic evaluation .   recommend that he undergo EGD after discharge,  alcohol cessation, NSAIDS.   Cont monitoring daily LFTs    Date  10/11    Day 3   Today he reports continued resolution of his abdominal pain.  His liver enzymes are decreasing.  Acute otitis panel and MARIANGEL is negative with viral and autoimmune studies pending.   RUQ  US showed cholelithiasis.   US Liver doppler normal.  CTAP  wnl.    liver enzymes are improving with supportive care.   etiology unclear.    start daily PPI given his upper GI symptoms  -  [facility] GI office:  schedule for an elective outpatient endoscopy to biopsy for H. pylori               ED Treatment-Medication Administration from 10/09/2024 2052 to 10/10/2024 0021         Date/Time Order Dose Route Action     10/09/2024 2133 ketorolac (TORADOL) injection 15 mg 15 mg Intravenous Given     10/09/2024 2133 ondansetron (ZOFRAN) injection 4 mg 4 mg Intravenous Given     10/09/2024 2133 sodium chloride 0.9 % bolus 1,000 mL 1,000 mL Intravenous New Bag     10/09/2024 2126 acetaminophen (TYLENOL) tablet 650 mg 650 mg Oral Given     10/09/2024 2125 famotidine (PEPCID) tablet 20 mg 20 mg Oral Given       Scheduled Medications:      Continuous IV Infusions:  sodium chloride, 50 mL/hr, Intravenous, Continuous      PRN Meds:     ED Triage Vitals   Temperature Pulse Respirations Blood Pressure SpO2 Pain Score   10/09/24 2059 10/09/24 2059 10/09/24 2059 10/09/24 2059 10/09/24 2059 10/09/24 2126   98.2 °F (36.8 °C) 63 18 149/85 97 % 10 - Worst Possible Pain     Weight (last 2 days)       Date/Time Weight    10/10/24 0535 117 (258.38)    10/09/24 2059 119 (261.91)            Vital Signs (last 3 days)       Date/Time Temp Pulse Resp BP Pain    10/10/24 1537 98 °F (36.7 °C) 63 18 118/88 --    10/10/24 0900 -- -- -- -- No Pain    10/10/24 07:30:33 97.8 °F (36.6 °C) 79 18 129/95 --    10/10/24 0111 -- -- -- -- No Pain    10/10/24 00:36:11 97.4 °F (36.3 °C) 74 -- 128/96 --    10/10/24 00:33:47 97.4 °F (36.3 °C) 70 -- -- --    10/09/24 2321 -- 60 18 121/86 --    10/09/24 2158 -- -- -- -- 6    10/09/24 2138 -- -- -- -- --    10/09/24 2126 -- -- -- -- 10 - Worst Possible Pain    10/09/24 2059 98.2 °F (36.8 °C) 63 18 149/85 --     Pertinent Labs/Diagnostic Test Results:   Radiology:  US liver doppler only   Final Interpretation by Lawrence Braswell MD (10/10 1628)      Normal liver Doppler.         Workstation performed: [id number]         US right upper quadrant   Final Interpretation by Pamela Osei MD (10/09 2342)   Cholelithiasis. No sonographic signs of cholecystitis.   No biliary dilatation. Unremarkable liver.   Incidental findings, as above.      Workstation performed: [id number]         CT Abdomen pelvis with contrast   Final Interpretation by Jacques Madden MD (10/09 2238)      No acute abdominopelvic abnormality identified.   No free air or free fluid.      Stable punctate right nephrolithiasis.         Workstation performed: [id number]           Cardiology:  No orders to display     GI:  No orders to display           Results from last 7 days   Lab Units 10/10/24  0412 10/09/24  2136   WBC Thousand/uL 5.76 6.43   HEMOGLOBIN g/dL 15.7 16.0    HEMATOCRIT % 47.3 47.8   PLATELETS Thousands/uL 166 171   TOTAL NEUT ABS Thousands/µL 3.29  --    BANDS PCT %  --  1         Results from last 7 days   Lab Units 10/10/24  0412 10/09/24  2136   SODIUM mmol/L 137 137   POTASSIUM mmol/L 4.0 4.7   CHLORIDE mmol/L 103 100   CO2 mmol/L 28 35*   ANION GAP mmol/L 6 2*   BUN mg/dL 11 12   CREATININE mg/dL 0.88 1.00   EGFR ml/min/1.73sq m 105 91   CALCIUM mg/dL 8.9 9.8   MAGNESIUM mg/dL 2.1  --      Results from last 7 days   Lab Units 10/10/24  0412 10/09/24  2136   AST U/L 531* 878*   ALT U/L 888* 1,041*   ALK PHOS U/L 120* 127*   TOTAL PROTEIN g/dL 7.3 7.8   ALBUMIN g/dL 4.0 4.3   TOTAL BILIRUBIN mg/dL 0.62 0.58         Results from last 7 days   Lab Units 10/10/24  0412 10/09/24  2136   GLUCOSE RANDOM mg/dL 85 108       Results from last 7 days   Lab Units 10/10/24  0412   CK TOTAL U/L 187             Results from last 7 days   Lab Units 10/10/24  0514   PROTIME seconds 12.9   INR  0.95       Results from last 7 days   Lab Units 10/09/24  2231   HEP B S AG  Non-reactive   HEP C AB  Non-reactive   HEP B C IGM  Non-reactive     Results from last 7 days   Lab Units 10/09/24  2136   LIPASE u/L 68                 Results from last 7 days   Lab Units 10/09/24  2141   CLARITY UA  Clear   COLOR UA  Yellow   SPEC GRAV UA  1.020   PH UA  6.5   GLUCOSE UA mg/dl Negative   KETONES UA mg/dl Negative   BLOOD UA  Negative   PROTEIN UA mg/dl Negative   NITRITE UA  Negative   BILIRUBIN UA  Negative   UROBILINOGEN UA E.U./dl 0.2   LEUKOCYTES UA  Negative                 Results from last 7 days   Lab Units 10/10/24  0412 10/09/24  2231   ETHANOL LVL mg/dL  --  <10   ACETAMINOPHEN LVL ug/mL <2* 5*   SALICYLATE LVL mg/dL  --  <5     Admitting Diagnosis: Abdominal pain [R10.9]  Transaminitis [R74.01]  Age/Sex: 43 y.o. male    Network Utilization Review Department  ATTENTION: Please call with any questions or concerns to [phone] and carefully listen to the prompts so that you are  directed to the right person. All voicemails are confidential.   For Discharge needs, contact Care Management DC Support Team at [phone] opt. 2  Send all requests for admission clinical reviews, approved or denied determinations and any other requests to dedicated fax number below belonging to the [facility] where the patient is receiving treatment. List of dedicated fax numbers for the Facilities:  FACILITY NAME UR FAX NUMBER   ADMISSION DENIALS (Administrative/Medical Necessity) [phone]   DISCHARGE SUPPORT TEAM (NETWORK) [phone]   PARENT CHILD HEALTH (Maternity/NICU/Pediatrics) [phone]   [facility] [phone]   [facility] [phone]   [facility] [phone]   [facility] [phone]   [facility] [phone]   [facility] [phone]   [facility] [phone]   [facility] [phone]   [facility] [phone]   [facility] [phone]   [facility] [phone]   [facility] [phone]

## 2024-10-10 NOTE — CASE MANAGEMENT
Case Management Assessment & Discharge Planning Note    Patient name Osvaldo Alex  Location [facility] /E5 -* MRN [id number]  : 1980 Date 10/10/2024       Current Admission Date: 10/9/2024  Current Admission Diagnosis:Transaminitis   Patient Active Problem List    Diagnosis Date Noted Date Diagnosed    Hyperlipidemia 10/10/2024     Plaque psoriasis 10/10/2024     Transaminitis 10/09/2024       LOS (days): 1  Geometric Mean LOS (GMLOS) (days): 2.6  Days to GMLOS:1.9     OBJECTIVE:    Risk of Unplanned Readmission Score: 4.35      Current admission status: Inpatient    Preferred Pharmacy:   [id number] - [place] - [address]  [address] [address]  Phone: [phone] Fax: [phone]    Primary Care Provider: No primary care provider on file.    Primary Insurance: BLUE CROSS  Secondary Insurance:     ASSESSMENT:    Patient Information  Mental Status: Alert    Social Determinants of Health (SDOH)      Flowsheet Row Most Recent Value   Housing Stability    In the last 12 months, was there a time when you were not able to pay the mortgage or rent on time? N   In the past 12 months, how many times have you moved where you were living? 0   At any time in the past 12 months, were you homeless or living in a shelter (including now)? N   Transportation Needs    In the past 12 months, has lack of transportation kept you from medical appointments or from getting medications? no   In the past 12 months, has lack of transportation kept you from meetings, work, or from getting things needed for daily living? No   Food Insecurity    Within the past 12 months, you worried that your food would run out before you got the money to buy more. Never true   Within the past 12 months, the food you bought just didn't last and you didn't have money to get more. Never true   Utilities    In the past 12 months has the electric, gas, oil, or water company threatened to shut  off services in your home? No     DISCHARGE DETAILS:    Pt states he has no needs. Pt declined HOST services.     same name as above

## 2024-10-10 NOTE — PLAN OF CARE
Problem: PAIN - ADULT  Goal: Verbalizes/displays adequate comfort level or baseline comfort level  Description: Interventions:  - Encourage patient to monitor pain and request assistance  - Assess pain using appropriate pain scale  - Administer analgesics based on type and severity of pain and evaluate response  - Implement non-pharmacological measures as appropriate and evaluate response  - Consider cultural and social influences on pain and pain management  - Notify physician/advanced practitioner if interventions unsuccessful or patient reports new pain  Outcome: Progressing     Problem: INFECTION - ADULT  Goal: Absence or prevention of progression during hospitalization  Description: INTERVENTIONS:  - Assess and monitor for signs and symptoms of infection  - Monitor lab/diagnostic results  - Monitor all insertion sites, i.e. indwelling lines, tubes, and drains  - Monitor endotracheal if appropriate and nasal secretions for changes in amount and color  - [place] appropriate cooling/warming therapies per order  - Administer medications as ordered  - Instruct and encourage patient and family to use good hand hygiene technique  - Identify and instruct in appropriate isolation precautions for identified infection/condition  Outcome: Progressing  Goal: Absence of fever/infection during neutropenic period  Description: INTERVENTIONS:  - Monitor WBC    Outcome: Progressing     Problem: SAFETY ADULT  Goal: Patient will remain free of falls  Description: INTERVENTIONS:  - Educate patient/family on patient safety including physical limitations  - Instruct patient to call for assistance with activity   - Consult OT/PT to assist with strengthening/mobility   - Keep Call bell within reach  - Keep bed low and locked with side rails adjusted as appropriate  - Keep care items and personal belongings within reach  - Initiate and maintain comfort rounds  - Make Fall Risk Sign visible to staff  - Offer Toileting every 2 Hours,  in advance of need  - Initiate/Maintain bed alarm  - Obtain necessary fall risk management equipment: socks   - Apply yellow socks and bracelet for high fall risk patients  - Consider moving patient to room near nurses station  Outcome: Progressing  Goal: Maintain or return to baseline ADL function  Description: INTERVENTIONS:  -  Assess patient's ability to carry out ADLs; assess patient's baseline for ADL function and identify physical deficits which impact ability to perform ADLs (bathing, care of mouth/teeth, toileting, grooming, dressing, etc.)  - Assess/evaluate cause of self-care deficits   - Assess range of motion  - Assess patient's mobility; develop plan if impaired  - Assess patient's need for assistive devices and provide as appropriate  - Encourage maximum independence but intervene and supervise when necessary  - Involve family in performance of ADLs  - Assess for home care needs following discharge   - Consider OT consult to assist with ADL evaluation and planning for discharge  - Provide patient education as appropriate  Outcome: Progressing  Goal: Maintains/Returns to pre admission functional level  Description: INTERVENTIONS:  - Perform AM-PAC 6 Click Basic Mobility/ Daily Activity assessment daily.  - Set and communicate daily mobility goal to care team and patient/family/caregiver.   - Collaborate with rehabilitation services on mobility goals if consulted  - Perform Range of Motion 4 times a day.  - Reposition patient every 2 hours.  - Dangle patient 3 times a day  - Stand patient 3 times a day  - Ambulate patient 3 times a day  - Out of bed to chair 3 times a day   - Out of bed for meals 3 times a day  - Out of bed for toileting  - Record patient progress and toleration of activity level   Outcome: Progressing     Problem: DISCHARGE PLANNING  Goal: Discharge to home or other facility with appropriate resources  Description: INTERVENTIONS:  - Identify barriers to discharge w/patient and  caregiver  - Arrange for needed discharge resources and transportation as appropriate  - Identify discharge learning needs (meds, wound care, etc.)  - Arrange for interpretive services to assist at discharge as needed  - Refer to Case Management Department for coordinating discharge planning if the patient needs post-hospital services based on physician/advanced practitioner order or complex needs related to functional status, cognitive ability, or social support system  Outcome: Progressing     Problem: Knowledge Deficit  Goal: Patient/family/caregiver demonstrates understanding of disease process, treatment plan, medications, and discharge instructions  Description: Complete learning assessment and assess knowledge base.  Interventions:  - Provide teaching at level of understanding  - Provide teaching via preferred learning methods  Outcome: Progressing

## 2024-10-10 NOTE — ASSESSMENT & PLAN NOTE
"Pt with PMHx of plaque psoarisis and HLD presented to the ED secondary to RUQ/epigastric pain and was incidentally found to have elevated liver enzymes  He reports his abdominal pain is now improved. He denies any other symptoms such as fever, chills, nausea, vomiting, or diarrhea  Pt denies sick contacts or recent illness  Of note, pt did present to ED with similar presentation 9/10 with mild transaminitis at that time  Pt reports tylenol use this evening for his abdominal pain, but states only taking 2-3 spaced out tablets prior to arrival to manage his symptoms. He denies frequent tylenol usage, but unable to provide dosing/timing.   Tylenol level 5 in the ED, will repeat in AM to ensure trending downward  Pt reports drinking on the weekends about 4 beers a day, but denies use during the week  CT a/p demonstrating \"No acute abdominopelvic abnormality identified. No free air or free fluid.\"  RUQ US demonstrating \"Cholelithiasis. No sonographic signs of cholecystitis.No biliary dilatation. Unremarkable liver.\"  Hepatitis panel pending   INR pending   Continue to trend CMP closely   GI consult pending   "

## 2024-10-10 NOTE — CONSULTS
Consultation -  [facility]  Patient: Osvaldo Alex 43 y.o. male   MRN: [id number]  PCP: No primary care provider on file.  Unit/Bed#: E5 -01 Encounter: [id number]  Length of Stay: 1 days        Inpatient consult to gastroenterology     Date/Time  10/9/2024 9:02 PM     Performed by  Terry Hughes MD   Authorized by  Sergio Griffiths PA-C         Assessment/Plan:  Osvaldo Alex is a 43 y.o. male with a PMH of obesity, HLD, GERD, psoriasis who presents with RUQ pain, N/V.    # elevated LFTs  # RUQ/epigastric pain  # N/V   - symptoms improved spontaneously and his LFTs are downtrending. He has only ever had modest LFT elevations last month when he was [facility]. Previously his LFTs have been fine   - his LFT elevations have a hepatocellular pattern. Negative w/u thus far include acute hep panel, tylenol lvl, RUQ US   - DDx includes drug-induced, ischemia, autoimmune, viral hepatitis, occlusive disease. However primary liver etiology would not explain his severe pain. He could also be having PUD/GJA ulcerations, recurrence of Hpy infxn, intermittent obstruction 2/2 anastomotic strictures, etc.   - await AIH serologies and EBV, CMV and HSV, as well as liver doppler   - supportive care for now   - repeat LFTs as outpatient to ensure normalization   - alcohol cessation advised   - avoidance of NSAIDs.   - we can see him in the office for monitoring of symptoms and possible outpt EGD. He would need Hpy Bx for eradication confirmation.    History of Present Illness:  Osvaldo Alex is a 43 y.o. male with a PMH of obesity, HLD, GERD, psoriasis who presents with RUQ pain, N/V.    He had a similar presentation to [facility] last month w/ sudden onset BUQ pain and nausea. He had modestly elevated LFTs at that point. His symptoms resolved fairly quickly and he was dced home.    Yesterday late afternoon / early evening he again developed RUQ pain w/ N/V, being unable to keep anything down PO. He took some  tylenol to help with the pain, but only a few tablets.    Now he feels completely back to normal and wants to go home.    Binge drinks on the weekends, ranging from > 5-10 beers on Saturday night. He has been drinking like this for many years.    He sometimes takes NSAIDs for occasional headaches.    EGD 05/27/22: antral gastritis Hpy+, no documentation if he was treated for this.    2022 he underwent sleeve gastrectomy. No notes, but prior CTAPs suggestive of sleeve anatomy.    GI HPI NEGATIVES:  Denies any recent diarrhea, constipation, hematemesis, heartburn, dysphagia, odynophagia, hematochezia, melena, stool incontinence, jaundice, pruritis, anorexia, weight loss, weakness, or fatigue.    REVIEW OF SYSTEMS:  Otherwise, pt denies any fevers/chills, lightheadedness, dizziness, CP, SOB, urinary symptoms, weakness/numbness/tingling.    History reviewed. No pertinent past medical history.  History reviewed. No pertinent surgical history.  Prior to Admission medications    Not on File     No Known Allergies  Social History     Tobacco Use    Smoking status: Never    Smokeless tobacco: Never   Substance Use Topics    Alcohol use: Yes     Comment: occ    Drug use: Never     History reviewed. No pertinent family history.    Objective:  /95 (BP Location: Left arm)   Pulse 79   Temp 97.8 °F (36.6 °C) (Oral)   Resp 18   Wt 117 kg (258 lb 6.1 oz)   SpO2 95%     Intake/Output Summary (Last 24 hours) at 10/10/2024 1359  Last data filed at 10/9/2024 2322  Gross per 24 hour   Intake 1000 ml   Output --   Net 1000 ml     There is no height or weight on file to calculate BMI.  Physical Exam  Constitutional:       General: He is not in acute distress.     Appearance: Normal appearance.   HENT:      Head: Normocephalic and atraumatic.      Nose: Nose normal.      Mouth/Throat:      Mouth: Mucous membranes are moist.   Eyes:      General: No scleral icterus.     Extraocular Movements: Extraocular movements intact.       Conjunctiva/sclera: Conjunctivae normal.      Pupils: Pupils are equal, round, and reactive to light.   Cardiovascular:      Rate and Rhythm: Normal rate and regular rhythm.   Pulmonary:      Effort: Pulmonary effort is normal.   Abdominal:      General: Abdomen is flat. There is no distension.      Palpations: There is no mass.      Tenderness: There is no abdominal tenderness.      Comments: Prior lap port sites w/ keloid scars   Musculoskeletal:         General: No swelling. Normal range of motion.   Skin:     General: Skin is warm and dry.      Capillary Refill: Capillary refill takes less than 2 seconds.   Neurological:      General: No focal deficit present.      Mental Status: He is alert.   Psychiatric:         Mood and Affect: Mood normal.         Labs:  I have reviewed all available labs and imaging results in Epic.  Results from last 7 days   Lab Units 10/10/24  0412 10/09/24  2136   SODIUM mmol/L 137 137   POTASSIUM mmol/L 4.0 4.7   CHLORIDE mmol/L 103 100   CO2 mmol/L 28 35*   BUN mg/dL 11 12   CREATININE mg/dL 0.88 1.00   GLUCOSE RANDOM mg/dL 85 108   CALCIUM mg/dL 8.9 9.8   ALBUMIN g/dL 4.0 4.3   ALK PHOS U/L 120* 127*   ALT U/L 888* 1,041*   AST U/L 531* 878*   EGFR ml/min/1.73sq m 105 91     Results from last 7 days   Lab Units 10/10/24  0412 10/09/24  2136   WBC Thousand/uL 5.76 6.43   HEMOGLOBIN g/dL 15.7 16.0   HEMATOCRIT % 47.3 47.8   PLATELETS Thousands/uL 166 171   SEGS PCT % 57  --    LYMPHO PCT % 32 29   MONO PCT % 8 4   EOS PCT % 2 4   BASOS PCT % 1 1   TOTAL NEUT ABS Thousands/µL 3.29  --    LYMPHS ABS Thousands/µL 1.83  --    MONOS ABS Thousand/µL 0.45  --    EOS ABS Thousand/µL 0.14 0.26   BASOS ABS Thousands/µL 0.04 0.06     Results from last 7 days   Lab Units 10/10/24  0514   PROTIME seconds 12.9   INR  0.95           Additional Labs:  Results from last 7 days   Lab Units 10/10/24  0412 10/09/24  2136   LIPASE u/L  --  68   MAGNESIUM mg/dL 2.1  --                 Imaging:  US right  upper quadrant   Final Result by Pamela Osei MD (10/09 2342)   Cholelithiasis. No sonographic signs of cholecystitis.   No biliary dilatation. Unremarkable liver.   Incidental findings, as above.      Workstation performed: [id number]         CT Abdomen pelvis with contrast   Final Result by Jacques Madden MD (10/09 2238)      No acute abdominopelvic abnormality identified.   No free air or free fluid.      Stable punctate right nephrolithiasis.         Workstation performed: [id number]         US liver doppler only    (Results Pending)       Medications:   Current Facility-Administered Medications   Medication Dose Route Frequency Provider Last Rate    sodium chloride  50 mL/hr Intravenous Continuous Sergio Griffiths PA-C 50 mL/hr (10/10/24 0257)       Problem List:  Patient Active Problem List   Diagnosis    Transaminitis    Hyperlipidemia    Plaque psoriasis       Case discussed with attending physician Dr. Jaiyeola. All recs are preliminary pending final attestation.    Terry Hughes, PGY-5

## 2024-10-11 ENCOUNTER — TELEPHONE (OUTPATIENT)
Dept: GASTROENTEROLOGY | Facility: MEDICAL CENTER | Age: 44
End: 2024-10-11

## 2024-10-11 VITALS
WEIGHT: 254.19 LBS | DIASTOLIC BLOOD PRESSURE: 83 MMHG | RESPIRATION RATE: 18 BRPM | SYSTOLIC BLOOD PRESSURE: 122 MMHG | TEMPERATURE: 96.7 F | OXYGEN SATURATION: 97 % | HEART RATE: 66 BPM

## 2024-10-11 LAB
ACTIN IGG SERPL-ACNC: 25 UNITS (ref 0–19)
ALBUMIN SERPL BCG-MCNC: 3.9 G/DL (ref 3.5–5)
ALP SERPL-CCNC: 128 U/L (ref 34–104)
ALT SERPL W P-5'-P-CCNC: 590 U/L (ref 7–52)
ANION GAP SERPL CALCULATED.3IONS-SCNC: 6 MMOL/L (ref 4–13)
AST SERPL W P-5'-P-CCNC: 182 U/L (ref 13–39)
BILIRUB SERPL-MCNC: 0.56 MG/DL (ref 0.2–1)
BUN SERPL-MCNC: 14 MG/DL (ref 5–25)
CALCIUM SERPL-MCNC: 9.3 MG/DL (ref 8.4–10.2)
CHLORIDE SERPL-SCNC: 103 MMOL/L (ref 96–108)
CMV IGG SERPL QL IA: POSITIVE
CMV IGM SERPL QL IA: NEGATIVE
CO2 SERPL-SCNC: 28 MMOL/L (ref 21–32)
CREAT SERPL-MCNC: 1.02 MG/DL (ref 0.6–1.3)
EBV EA IGG SER QL IA: NEGATIVE
EBV NA IGG SER QL IA: POSITIVE
EBV VCA IGG SER QL IA: POSITIVE
EBV VCA IGM SER QL IA: NEGATIVE
ERYTHROCYTE [DISTWIDTH] IN BLOOD BY AUTOMATED COUNT: 13.9 % (ref 11.6–15.1)
GFR SERPL CREATININE-BSD FRML MDRD: 89 ML/MIN/1.73SQ M
GLUCOSE SERPL-MCNC: 76 MG/DL (ref 65–140)
HCT VFR BLD AUTO: 48 % (ref 36.5–49.3)
HGB BLD-MCNC: 16.1 G/DL (ref 12–17)
INR PPP: 1 (ref 0.85–1.19)
MCH RBC QN AUTO: 29.1 PG (ref 26.8–34.3)
MCHC RBC AUTO-ENTMCNC: 33.5 G/DL (ref 31.4–37.4)
MCV RBC AUTO: 87 FL (ref 82–98)
PLATELET # BLD AUTO: 163 THOUSANDS/UL (ref 149–390)
PMV BLD AUTO: 12.1 FL (ref 8.9–12.7)
POTASSIUM SERPL-SCNC: 4.6 MMOL/L (ref 3.5–5.3)
PROT SERPL-MCNC: 7.2 G/DL (ref 6.4–8.4)
PROTHROMBIN TIME: 13.4 SECONDS (ref 12.3–15)
RBC # BLD AUTO: 5.53 MILLION/UL (ref 3.88–5.62)
SODIUM SERPL-SCNC: 137 MMOL/L (ref 135–147)
WBC # BLD AUTO: 6.35 THOUSAND/UL (ref 4.31–10.16)

## 2024-10-11 PROCEDURE — 99239 HOSP IP/OBS DSCHRG MGMT >30: CPT | Performed by: STUDENT IN AN ORGANIZED HEALTH CARE EDUCATION/TRAINING PROGRAM

## 2024-10-11 PROCEDURE — 80053 COMPREHEN METABOLIC PANEL: CPT | Performed by: STUDENT IN AN ORGANIZED HEALTH CARE EDUCATION/TRAINING PROGRAM

## 2024-10-11 PROCEDURE — 85027 COMPLETE CBC AUTOMATED: CPT | Performed by: STUDENT IN AN ORGANIZED HEALTH CARE EDUCATION/TRAINING PROGRAM

## 2024-10-11 PROCEDURE — 85610 PROTHROMBIN TIME: CPT | Performed by: STUDENT IN AN ORGANIZED HEALTH CARE EDUCATION/TRAINING PROGRAM

## 2024-10-11 PROCEDURE — 99232 SBSQ HOSP IP/OBS MODERATE 35: CPT | Performed by: INTERNAL MEDICINE

## 2024-10-11 RX ORDER — OMEPRAZOLE 40 MG/1
40 CAPSULE, DELAYED RELEASE ORAL DAILY
Qty: 30 CAPSULE | Refills: 0 | Status: SHIPPED | OUTPATIENT
Start: 2024-10-11 | End: 2024-11-10

## 2024-10-11 NOTE — PLAN OF CARE
Problem: PAIN - ADULT  Goal: Verbalizes/displays adequate comfort level or baseline comfort level  Description: Interventions:  - Encourage patient to monitor pain and request assistance  - Assess pain using appropriate pain scale  - Administer analgesics based on type and severity of pain and evaluate response  - Implement non-pharmacological measures as appropriate and evaluate response  - Consider cultural and social influences on pain and pain management  - Notify physician/advanced practitioner if interventions unsuccessful or patient reports new pain  Outcome: Progressing     Problem: INFECTION - ADULT  Goal: Absence or prevention of progression during hospitalization  Description: INTERVENTIONS:  - Assess and monitor for signs and symptoms of infection  - Monitor lab/diagnostic results  - Monitor all insertion sites, i.e. indwelling lines, tubes, and drains  - Monitor endotracheal if appropriate and nasal secretions for changes in amount and color  - [place] appropriate cooling/warming therapies per order  - Administer medications as ordered  - Instruct and encourage patient and family to use good hand hygiene technique  - Identify and instruct in appropriate isolation precautions for identified infection/condition  Outcome: Progressing  Goal: Absence of fever/infection during neutropenic period  Description: INTERVENTIONS:  - Monitor WBC    Outcome: Progressing     Problem: SAFETY ADULT  Goal: Patient will remain free of falls  Description: INTERVENTIONS:  - Educate patient/family on patient safety including physical limitations  - Instruct patient to call for assistance with activity   - Consult OT/PT to assist with strengthening/mobility   - Keep Call bell within reach  - Keep bed low and locked with side rails adjusted as appropriate  - Keep care items and personal belongings within reach  - Initiate and maintain comfort rounds  - Make Fall Risk Sign visible to staff  - Offer Toileting every 2 Hours,  in advance of need  - Initiate/Maintain bedalarm  - Obtain necessary fall risk management equipment  - Apply yellow socks and bracelet for high fall risk patients  - Consider moving patient to room near nurses station  Outcome: Progressing  Goal: Maintain or return to baseline ADL function  Description: INTERVENTIONS:  -  Assess patient's ability to carry out ADLs; assess patient's baseline for ADL function and identify physical deficits which impact ability to perform ADLs (bathing, care of mouth/teeth, toileting, grooming, dressing, etc.)  - Assess/evaluate cause of self-care deficits   - Assess range of motion  - Assess patient's mobility; develop plan if impaired  - Assess patient's need for assistive devices and provide as appropriate  - Encourage maximum independence but intervene and supervise when necessary  - Involve family in performance of ADLs  - Assess for home care needs following discharge   - Consider OT consult to assist with ADL evaluation and planning for discharge  - Provide patient education as appropriate  Outcome: Progressing  Goal: Maintains/Returns to pre admission functional level  Description: INTERVENTIONS:  - Perform AM-PAC 6 Click Basic Mobility/ Daily Activity assessment daily.  - Set and communicate daily mobility goal to care team and patient/family/caregiver.   - Collaborate with rehabilitation services on mobility goals if consulted  - Perform Range of Motion 3 times a day.  - Reposition patient every 1 hours.  - Dangle patient 2 times a day  - Stand patient 2 times a day  - Ambulate patient 3 times a day  - Out of bed to chair 3 times a day   - Out of bed for meals 3 times a day  - Out of bed for toileting  - Record patient progress and toleration of activity level   Outcome: Progressing     Problem: DISCHARGE PLANNING  Goal: Discharge to home or other facility with appropriate resources  Description: INTERVENTIONS:  - Identify barriers to discharge w/patient and caregiver  -  Arrange for needed discharge resources and transportation as appropriate  - Identify discharge learning needs (meds, wound care, etc.)  - Arrange for interpretive services to assist at discharge as needed  - Refer to Case Management Department for coordinating discharge planning if the patient needs post-hospital services based on physician/advanced practitioner order or complex needs related to functional status, cognitive ability, or social support system  Outcome: Progressing     Problem: Knowledge Deficit  Goal: Patient/family/caregiver demonstrates understanding of disease process, treatment plan, medications, and discharge instructions  Description: Complete learning assessment and assess knowledge base.  Interventions:  - Provide teaching at level of understanding  - Provide teaching via preferred learning methods  Outcome: Progressing

## 2024-10-11 NOTE — TELEPHONE ENCOUNTER
Called patient to cell and home phone but phones are not in service. Tried to leave voicemail but couldn't. Wanted to schedule follow up for patient after being discharged from hospital.          f/u appt  Received: Yesterday  Terry Hughes MD  P [facility] [place] [facility]  Hey can we schedule this diya for a f/u appt?

## 2024-10-11 NOTE — ASSESSMENT & PLAN NOTE
Pt with PMHx of plaque psoarisis and HLD presented to the ED secondary to RUQ/epigastric pain and was incidentally found to have elevated liver enzymes  He reports his abdominal pain is now improved on admission  CT abdomen pelvis showed no acute abdominal process  RUQ US showed cholelithiasis without infection or GB wall thickening  Hepatitis panel, Tylenol, EBV, CMV negative  Liver dopplers neg  Discussed with GI, okay for discharge  Unclear of etiology but patient would benefit from EGD outpatient with history of H. pylori

## 2024-10-11 NOTE — PLAN OF CARE
Problem: PAIN - ADULT  Goal: Verbalizes/displays adequate comfort level or baseline comfort level  Description: Interventions:  - Encourage patient to monitor pain and request assistance  - Assess pain using appropriate pain scale  - Administer analgesics based on type and severity of pain and evaluate response  - Implement non-pharmacological measures as appropriate and evaluate response  - Consider cultural and social influences on pain and pain management  - Notify physician/advanced practitioner if interventions unsuccessful or patient reports new pain  Outcome: Progressing     Problem: INFECTION - ADULT  Goal: Absence or prevention of progression during hospitalization  Description: INTERVENTIONS:  - Assess and monitor for signs and symptoms of infection  - Monitor lab/diagnostic results  - Monitor all insertion sites, i.e. indwelling lines, tubes, and drains  - Monitor endotracheal if appropriate and nasal secretions for changes in amount and color  - Moriarty appropriate cooling/warming therapies per order  - Administer medications as ordered  - Instruct and encourage patient and family to use good hand hygiene technique  - Identify and instruct in appropriate isolation precautions for identified infection/condition  Outcome: Progressing  Goal: Absence of fever/infection during neutropenic period  Description: INTERVENTIONS:  - Monitor WBC    Outcome: Progressing     Problem: SAFETY ADULT  Goal: Patient will remain free of falls  Description: INTERVENTIONS:  - Educate patient/family on patient safety including physical limitations  - Instruct patient to call for assistance with activity   - Consult OT/PT to assist with strengthening/mobility   - Keep Call bell within reach  - Keep bed low and locked with side rails adjusted as appropriate  - Keep care items and personal belongings within reach  - Initiate and maintain comfort rounds  - Make Fall Risk Sign visible to staff  - Offer Toileting every 2 Hours,  in advance of need  - Initiate/Maintain bedalarm  - Obtain necessary fall risk management equipment  - Apply yellow socks and bracelet for high fall risk patients  - Consider moving patient to room near nurses station  Outcome: Progressing  Goal: Maintain or return to baseline ADL function  Description: INTERVENTIONS:  -  Assess patient's ability to carry out ADLs; assess patient's baseline for ADL function and identify physical deficits which impact ability to perform ADLs (bathing, care of mouth/teeth, toileting, grooming, dressing, etc.)  - Assess/evaluate cause of self-care deficits   - Assess range of motion  - Assess patient's mobility; develop plan if impaired  - Assess patient's need for assistive devices and provide as appropriate  - Encourage maximum independence but intervene and supervise when necessary  - Involve family in performance of ADLs  - Assess for home care needs following discharge   - Consider OT consult to assist with ADL evaluation and planning for discharge  - Provide patient education as appropriate  Outcome: Progressing  Goal: Maintains/Returns to pre admission functional level  Description: INTERVENTIONS:  - Perform AM-PAC 6 Click Basic Mobility/ Daily Activity assessment daily.  - Set and communicate daily mobility goal to care team and patient/family/caregiver.   - Collaborate with rehabilitation services on mobility goals if consulted  - Perform Range of Motion 3 times a day.  - Reposition patient every 1 hours.  - Dangle patient 2 times a day  - Stand patient 2 times a day  - Ambulate patient 3 times a day  - Out of bed to chair 3 times a day   - Out of bed for meals 3 times a day  - Out of bed for toileting  - Record patient progress and toleration of activity level   Outcome: Progressing     Problem: DISCHARGE PLANNING  Goal: Discharge to home or other facility with appropriate resources  Description: INTERVENTIONS:  - Identify barriers to discharge w/patient and caregiver  -  Arrange for needed discharge resources and transportation as appropriate  - Identify discharge learning needs (meds, wound care, etc.)  - Arrange for interpretive services to assist at discharge as needed  - Refer to Case Management Department for coordinating discharge planning if the patient needs post-hospital services based on physician/advanced practitioner order or complex needs related to functional status, cognitive ability, or social support system  Outcome: Progressing     Problem: Knowledge Deficit  Goal: Patient/family/caregiver demonstrates understanding of disease process, treatment plan, medications, and discharge instructions  Description: Complete learning assessment and assess knowledge base.  Interventions:  - Provide teaching at level of understanding  - Provide teaching via preferred learning methods  Outcome: Progressing

## 2024-10-11 NOTE — ASSESSMENT & PLAN NOTE
[facility]  Receives ixekizumab (Taltz) injections monthly

## 2024-10-11 NOTE — NURSING NOTE
Pt d/c by himself, instructions, medications and follow up appts reviewed and pt had no questions. IV taken out.

## 2024-10-11 NOTE — DISCHARGE SUMMARY
Discharge Summary - Hospitalist   Name: Osvaldo Alex 43 y.o. male I MRN: [id number]  Unit/Bed#: E5 -01 I Date of Admission: 10/9/2024   Date of Service: 10/11/2024 I Hospital Day: 2     Assessment & Plan  Transaminitis  Pt with PMHx of plaque psoarisis and HLD presented to the ED secondary to RUQ/epigastric pain and was incidentally found to have elevated liver enzymes  He reports his abdominal pain is now improved on admission  CT abdomen pelvis showed no acute abdominal process  RUQ US showed cholelithiasis without infection or GB wall thickening  Hepatitis panel, Tylenol, EBV, CMV negative  Liver dopplers neg  Discussed with GI, lea for discharge  Unclear of etiology but patient would benefit from EGD outpatient with history of H. pylori  Hyperlipidemia  Continue statin therapy  Plaque psoriasis  [facility]  Receives ixekizumab (Taltz) injections monthly     Discharging Physician / Practitioner: Papo Aden MD  PCP: No primary care provider on file.  Admission Date:   Admission Orders (From admission, onward)       Ordered        10/09/24 2324  INPATIENT ADMISSION  Once                          Discharge Date: 10/11/24    Medical Problems        Consultations During Hospital Stay:  Gastroenterology    Procedures Performed:   none    Significant Findings / Test Results:   US liver doppler only    Result Date: 10/10/2024  Impression: Normal liver Doppler. Workstation performed: [id number]     US right upper quadrant    Result Date: 10/9/2024  Impression: Cholelithiasis. No sonographic signs of cholecystitis. No biliary dilatation. Unremarkable liver. Incidental findings, as above. Workstation performed: [id number]     CT Abdomen pelvis with contrast    Result Date: 10/9/2024  Impression: No acute abdominopelvic abnormality identified. No free air or free fluid. Stable punctate right nephrolithiasis. Workstation performed: [id number]        Incidental Findings:   none      Test Results Pending at Discharge (will require follow up):   none     Outpatient Tests Requested:  none    Complications:  none    Reason for Admission:  Abdominal Pain, transaminitis    Hospital Course:     Osvaldo Alex is a 43 y.o. male patient who originally presented to the hospital on 10/9/2024 due to abdominal pain with elevated LFTs.  CT abdomen pelvis and right upper quadrant were normal, did have evidence of cholelithiasis.  Liver Dopplers were negative for any thrombus.  LFTs were did trend downwards.  His symptoms did quickly resolve upon admission.  He was able to tolerate diet without any other complaints.  Denied any nausea, vomiting, epigastric pain. As his LFTs did trend downwards, unclear of etiology and he was asymptomatic, GI was okay for him to be discharged home.    Given history of prior EGD with H. pylori.  GI is recommend to continue PPI once daily and to follow-up outpatient for repeat EGD.    Please see above list of diagnoses and related plan for additional information.     Condition at Discharge: fair     Discharge Day Visit / Exam:     Subjective:    Patient reports doing well today.  Was able to tolerate diet without any pain, nausea or vomiting.  Discussed discharge and GI follow-up.  Vitals: Blood Pressure: 122/83 (10/11/24 0700)  Pulse: 66 (10/11/24 0700)  Temperature: (!) 96.7 °F (35.9 °C) (10/11/24 0854)  Temp Source: Oral (10/11/24 0854)  Respirations: 18 (10/11/24 0700)  Weight - Scale: 115 kg (254 lb 3.1 oz) (10/11/24 0600)  SpO2: 97 % (10/11/24 0700)  Exam:   Physical Exam  Vitals and nursing note reviewed.   Constitutional:       Appearance: Normal appearance. He is obese.   HENT:      Head: Normocephalic.   Eyes:      Conjunctiva/sclera: Conjunctivae normal.   Cardiovascular:      Rate and Rhythm: Normal rate.      Heart sounds: Normal heart sounds.   Pulmonary:      Breath sounds: Normal breath sounds. No wheezing or rhonchi.   Abdominal:      General: Bowel sounds  are normal. There is no distension.      Palpations: Abdomen is soft.      Tenderness: There is no abdominal tenderness.   Musculoskeletal:         General: No swelling. Normal range of motion.   Skin:     General: Skin is warm and dry.   Neurological:      General: No focal deficit present.      Mental Status: He is alert and oriented to person, place, and time. Mental status is at baseline.         Discharge instructions/Information to patient and family:   See after visit summary for information provided to patient and family.      Provisions for Follow-Up Care:  See after visit summary for information related to follow-up care and any pertinent home health orders.      Disposition:     Home     Discharge Statement:  I spent 40 minutes discharging the patient. This time was spent on the day of discharge. I had direct contact with the patient on the day of discharge. Greater than 50% of the total time was spent examining patient, answering all patient questions, arranging and discussing plan of care with patient as well as directly providing post-discharge instructions.  Additional time then spent on discharge activities.    Discharge Medications:  See after visit summary for reconciled discharge medications provided to patient and family.      ** Please Note: This note has been constructed using a voice recognition system **

## 2024-10-11 NOTE — PROGRESS NOTES
Progress Note -  [facility]  Patient: Osvaldo Alex 43 y.o. male   MRN: [id number]  PCP: No primary care provider on file.  Unit/Bed#: E5 -01 Encounter: [id number]  Length of Stay: 2 days    Assessment/Plan  Osvaldo Alex is a 43 y.o. male with a PMH of obesity s/p sleeve 2022, HLD, GERD, psoriasis who presents with RUQ pain, N/V.     # elevated LFTs, improving  # RUQ/epigastric pain, resolved  # N/V, resolved   - symptoms improved spontaneously and his LFTs are downtrending. He has only ever had modest LFT elevations last month when he was [facility]. Previously his LFTs have been fine   - his LFT elevations have a hepatocellular pattern. Negative w/u thus far include acute hep panel, tylenol lvl, RUQ US and doppler, viral hep panel, MARIANGEL, acute EBV and CMB   - DDx includes drug-induced, ischemia, autoimmune, viral hepatitis, occlusive disease. However primary liver etiology would not explain his severe pain. He could also be having PUD/GJA ulcerations, recurrence of Hpy infxn, intermittent obstruction 2/2 anastomotic strictures, etc.   - await AIH serologies and HSV   - repeat LFTs as outpatient to ensure normalization   - alcohol cessation and NSAID avoidance advised   - ok to discharge on PPI daily   - we can see him in the office for monitoring of symptoms and possible outpt EGD. He would need Hpy Bx for eradication confirmation. Stool testing not done inpatient because he has yet to have a BM.    Subjective:  Interval History:   NAEO. Feels fine still and hopes to go home. Eating w/o issue. No BM yet  Otherwise, pt denies any fevers/chills, lightheadedness, dizziness, CP, SOB, N/V/D/C, abdom pain, urinary symptoms, weakness/numbness/tingling.    ROS otherwise as covered in Interval History.    Objective:  /83 (BP Location: Left arm)   Pulse 66   Temp (!) 96.7 °F (35.9 °C) (Oral)   Resp 18   Wt 115 kg (254 lb 3.1 oz)   SpO2 97%     Intake/Output Summary (Last 24 hours) at  10/11/2024 1117  Last data filed at 10/11/2024 1012  Gross per 24 hour   Intake 240 ml   Output --   Net 240 ml     Physical Exam  Constitutional:       General: He is not in acute distress.     Appearance: Normal appearance.   HENT:      Head: Normocephalic and atraumatic.      Nose: Nose normal.      Mouth/Throat:      Mouth: Mucous membranes are moist.   Eyes:      General: No scleral icterus.     Extraocular Movements: Extraocular movements intact.      Conjunctiva/sclera: Conjunctivae normal.      Pupils: Pupils are equal, round, and reactive to light.   Cardiovascular:      Rate and Rhythm: Normal rate and regular rhythm.   Pulmonary:      Effort: Pulmonary effort is normal.   Abdominal:      General: Abdomen is flat. There is no distension.      Palpations: There is no mass.      Tenderness: There is no abdominal tenderness.      Comments: Prior lap port sites w/ keloid scars   Musculoskeletal:         General: No swelling. Normal range of motion.   Skin:     General: Skin is warm and dry.      Capillary Refill: Capillary refill takes less than 2 seconds.   Neurological:      General: No focal deficit present.      Mental Status: He is alert.   Psychiatric:         Mood and Affect: Mood normal.         Labs:  I have reviewed all available labs and imaging results in Epic.  Results from last 7 days   Lab Units 10/11/24  0452 10/10/24  0412 10/09/24  2136   SODIUM mmol/L 137 137 137   POTASSIUM mmol/L 4.6 4.0 4.7   CHLORIDE mmol/L 103 103 100   CO2 mmol/L 28 28 35*   BUN mg/dL 14 11 12   CREATININE mg/dL 1.02 0.88 1.00   GLUCOSE RANDOM mg/dL 76 85 108   CALCIUM mg/dL 9.3 8.9 9.8   ALBUMIN g/dL 3.9 4.0 4.3   ALK PHOS U/L 128* 120* 127*   ALT U/L 590* 888* 1,041*   AST U/L 182* 531* 878*   EGFR ml/min/1.73sq m 89 105 91     Results from last 7 days   Lab Units 10/11/24  0452 10/10/24  0412 10/09/24  2136   WBC Thousand/uL 6.35 5.76 6.43   HEMOGLOBIN g/dL 16.1 15.7 16.0   HEMATOCRIT % 48.0 47.3 47.8   PLATELETS  Thousands/uL 163 166 171   SEGS PCT %  --  57  --    LYMPHO PCT %  --  32 29   MONO PCT %  --  8 4   EOS PCT %  --  2 4   BASOS PCT %  --  1 1   TOTAL NEUT ABS Thousands/µL  --  3.29  --    LYMPHS ABS Thousands/µL  --  1.83  --    MONOS ABS Thousand/µL  --  0.45  --    EOS ABS Thousand/µL  --  0.14 0.26   BASOS ABS Thousands/µL  --  0.04 0.06     Results from last 7 days   Lab Units 10/11/24  0452 10/10/24  0514   PROTIME seconds 13.4 12.9   INR  1.00 0.95     Results from last 7 days   Lab Units 10/10/24  0412   CK TOTAL U/L 187       Additional Labs:  Results from last 7 days   Lab Units 10/10/24  0412 10/09/24  2136   LIPASE u/L  --  68   MAGNESIUM mg/dL 2.1  --                 Imaging:  US liver doppler only   Final Result by Lawrence Braswell MD (10/10 1628)      Normal liver Doppler.         Workstation performed: [id number]         US right upper quadrant   Final Result by Pamela Osei MD (10/09 2342)   Cholelithiasis. No sonographic signs of cholecystitis.   No biliary dilatation. Unremarkable liver.   Incidental findings, as above.      Workstation performed: [id number]         CT Abdomen pelvis with contrast   Final Result by Jacques Madden MD (10/09 2238)      No acute abdominopelvic abnormality identified.   No free air or free fluid.      Stable punctate right nephrolithiasis.         Workstation performed: [id number]             Medications:       Problem List:  Patient Active Problem List   Diagnosis    Transaminitis    Hyperlipidemia    Plaque psoriasis       Case discussed with attending physician Dr. Jaiyeola. All recs are preliminary pending final attestation.    Terry Hughes, PGY-5

## 2024-10-13 LAB
IGG SERPL-MCNC: 1242 MG/DL (ref 603–1613)
IGG1 SER-MCNC: 596 MG/DL (ref 248–810)
IGG2 SER-MCNC: 395 MG/DL (ref 130–555)
IGG3 SER-MCNC: 147 MG/DL (ref 15–102)
IGG4 SER-MCNC: 66 MG/DL (ref 2–96)

## 2024-10-14 LAB
HSV1 DNA SPEC QL NAA+PROBE: NEGATIVE
HSV2 DNA SPEC QL NAA+PROBE: NEGATIVE

## 2024-10-14 NOTE — UTILIZATION REVIEW
NOTIFICATION OF ADMISSION DISCHARGE   This is a Notification of Discharge from [facility]. Please be advised that this patient has been discharge from our facility. Below you will find the admission and discharge date and time including the patient’s disposition.   UTILIZATION REVIEW CONTACT:  Sierra Keating  Utilization   Network Utilization Review Department  Phone: [phone] x carefully listen to the prompts. All voicemails are confidential.  Email: [email or web address]     ADMISSION INFORMATION  PRESENTATION DATE: 10/9/2024  9:02 PM  OBERVATION ADMISSION DATE: N/A  INPATIENT ADMISSION DATE: 10/9/24 11:24 PM   DISCHARGE DATE: 10/11/2024 12:02 PM   DISPOSITION:Home/Self Care    Network Utilization Review Department  ATTENTION: Please call with any questions or concerns to [phone] and carefully listen to the prompts so that you are directed to the right person. All voicemails are confidential.   For Discharge needs, contact Care Management DC Support Team at [phone] opt. 2  Send all requests for admission clinical reviews, approved or denied determinations and any other requests to dedicated fax number below belonging to the campus where the patient is receiving treatment. List of dedicated fax numbers for the Facilities:  FACILITY NAME UR FAX NUMBER   ADMISSION DENIALS (Administrative/Medical Necessity) [phone]   DISCHARGE SUPPORT TEAM ([facility]) [phone]   PARENT CHILD HEALTH (Maternity/NICU/Pediatrics) [phone]   [facility] [phone]   [facility] [phone]   [facility] [phone]   [facility] [phone]   [facility] [phone]   [facility] [phone]   [facility] [phone]   [facility] [phone]    [facility] [phone]   [facility] [phone]   [facility] [phone]   [facility] [phone]